# Patient Record
Sex: FEMALE | Race: WHITE | Employment: OTHER | URBAN - METROPOLITAN AREA
[De-identification: names, ages, dates, MRNs, and addresses within clinical notes are randomized per-mention and may not be internally consistent; named-entity substitution may affect disease eponyms.]

---

## 2020-08-24 ENCOUNTER — HOSPITAL ENCOUNTER (OUTPATIENT)
Dept: PHYSICAL THERAPY | Age: 75
Discharge: HOME | End: 2020-08-24
Payer: MEDICARE

## 2020-08-24 PROCEDURE — 97161 PT EVAL LOW COMPLEX 20 MIN: CPT

## 2020-08-24 PROCEDURE — 97110 THERAPEUTIC EXERCISES: CPT

## 2020-08-24 NOTE — THERAPY EVALUATION
Farzaneh Urbina  : 1945  [place]  Secondary:  [address]  at [facility] HENRY OVIEDO  [address] [facility], [address], [place] [address].  [phone]   [phone]       OUTPATIENT PHYSICAL THERAPY:Initial Assessment 2020     ICD-10: Treatment Diagnosis: Low back pain (M54.5)  Precautions/Allergies: Allergic to:  Latex, PCN, Benadryl, Betadine, Dilaudid  TREATMENT PLAN:  Effective Dates: 2020 TO 2020 (90 days). Frequency/Duration: 1-2 times a week for 90 Day(s) MEDICAL/REFERRING DIAGNOSIS:  Low back pain [M54.5]    DATE OF ONSET:   REFERRING PHYSICIAN: Lorene Avitia MD MD Orders: Lorrine Rom and treat  Return MD Appointment: 20 to Dr. Orlando Muñoz; to [facility] on 20     INITIAL ASSESSMENT:  Ms. Gonzalez Simmons is a 75year old female with complaints of back pain especially with standing or walking. She presents with decreased lumbar ROM, generalized weakness in LEs and abdomen, tight hamstrings, and radiographic findings of stenosis and facet arthropathy. She could benefit from PT to address deficits and work toward goals. She is also to follow up with back specialist at [facility] in mid September. PROBLEM LIST (Impacting functional limitations):  1. Decreased Strength  2. Decreased Ambulation Ability/Technique  3. Increased Pain  4. Decreased Flexibility/Joint Mobility INTERVENTIONS PLANNED: (Treatment may consist of any combination of the following)  1. Home Exercise Program (HEP)  2. Manual Therapy  3. Therapeutic Exercise/Strengthening  4. modals as needed   5. Aquatic PT as needed     GOALS: (Goals have been discussed and agreed upon with patient.)  Patient's stated goal is to be able to walk or stand longer before she has to sit down. Short-Term Functional Goals: Time Frame:6 weeks  1. Patient to be independent with HEP  2.  Patient to report she is able to walk or stand 50% longer without pain than she could prior to starting PT  Discharge Goals: Time Frame: 90 days  1. Patient to report no more than minimal back pain with all activity  2. Patient to improve Oswestry score to 10 (from 19) to demo improved functional mobility    OUTCOME MEASURE:   Tool Used: Modified Oswestry Low Back Pain Questionnaire  Score:  Initial: 19/50  Most Recent: X/50 (Date: -- )   Interpretation of Score: Each section is scored on a 0-5 scale, 5 representing the greatest disability. The scores of each section are added together for a total score of 50. MEDICAL NECESSITY:   · Patient demonstrates good rehab potential due to higher previous functional level. REASON FOR SERVICES/OTHER COMMENTS:  · Patient continues to require skilled intervention due to desire to return to higher level of function. Total Duration:  45 minutes evaluation, 10 minutes exercise  PT Patient Time In/Time Out  Time In: 1030  Time Out: 1125    Rehabilitation Potential For Stated Goals: Good  Regarding Lilly Galeas's therapy, I certify that the treatment plan above will be carried out by a therapist or under their direction. Thank you for this referral,    Cesilia Combs PT      Referring Physician Signature: Gabriel Taylor MD _______________________________ Date _____________       PAIN/SUBJECTIVE:   Initial: Pain Intensity 1: 0(None now, 5 or 6 at worst)   Post Session: \"No worse\"   HISTORY:   History of Injury/Illness (Reason for Referral):  Patient reports onset of symptoms in 2015 with gradual progression since then. Pain is worse with walking or standing and is relieved by sitting. She notes that she gets lymph edema massage regularly due to problems after her last round of chemo. Also sees a chiropractor regularly  Past Medical History/Comorbidities:  Breast cancer sugery in 9328,8599 and 2005; Peripheral neuropathy, high cholesterol, and L ankle fracture  Social History/Living Environment:     Lives with .  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 Ambulatory/Rehab Services H2 Model Falls Risk Assessment   Risk Factors:       (1)  Orthostatic drop in BP Ability to Rise from Chair:       (0)  Ability to rise in a single movement   Falls Prevention Plan:       No modifications necessary   Total: (5 or greater = High Risk): 1   ©2010 [facility] of [employer]. All Rights Reserved. [facility] [place] Patent #9,[id number]. Federal Law prohibits the replication, distribution or use without written permission from [facility] Virtual 3-D Display for Smartphones   Current Medications: Celebrex, Colcrys, Gabapentin, Pravastatin, Flonase, Asp[irin, Biotin, CoQ10, IPT, Omega 3, Probiotic, Zinc   Date Last Reviewed:  8/24/20   Number of Personal Factors/Comorbidities that affect the Plan of Care: 0: LOW COMPLEXITY   EXAMINATION:     Observation/Orthostatic Postural Assessment: patient is moderately overweight. Palpation: pelvic landmarks symmetrical  ROM: lumbar flex 100%, lumbar extn 25%, B side bend 50%, B rotation 75%  Strength: B LE's 4 to 4+/5. Weak in abdominals. Special Tests: none  Neurological Screen: light touch intact in LEs except on plantar surfaces of feet. Functional Mobility:  independent         Balance:  She has orthostatic hypotension so she has to wait before moving when she changes position. Body Structures Involved:  1. Joints  2. Muscles Body Functions Affected:  1. Neuromusculoskeletal  2. Movement Related Activities and Participation Affected:  1. General Tasks and Demands  2.  Community, Social and [place]   Number of elements (examined above) that affect the Plan of Care: 1-2: LOW COMPLEXITY   CLINICAL PRESENTATION:   Presentation: Stable and uncomplicated: LOW COMPLEXITY   CLINICAL DECISION MAKING:   Use of outcome tool(s) and clinical judgement create a POC that gives a: Questionable prediction of patient's progress: MODERATE COMPLEXITY

## 2020-08-24 NOTE — PROGRESS NOTES
Ben Carbajal  : 1945  [place] / Product Type: Medicare /  [address]  at [facility] HENRY OVIEDO  [address], [address], [place] [address].  [phone]   [phone]       OUTPATIENT PHYSICAL THERAPY: Daily Treatment Note 2020  Visit Count: 1  ICD-10: Treatment Diagnosis: Low back pain (M54.5)  Precautions/Allergies: Allergic to:  Latex, PCN, Benadryl, Betadine, Dilaudid  TREATMENT PLAN:  Effective Dates: 2020 TO 2020 (90 days). Frequency/Duration: 1-2 times a week for 90 Day(s) MEDICAL/REFERRING DIAGNOSIS:  Low back pain [M54.5]    DATE OF ONSET:   REFERRING PHYSICIAN: Caitie Ziegler MD MD Orders: Hernandez Bancroft and treat  Return MD Appointment: 20 to Dr. Deloris Sicard; to Beka Larsen on 20            Pre-treatment Symptoms/Complaints:  See evaluation  Pain: Initial: Pain Intensity 1: 0(None now, 5 or 6 at worst)   Post Session:  No increase   Medications Last Reviewed:  20    Updated Objective Findings:   See evaluation note from today     TREATMENT:     Therapeutic Exercise: (10 Minutes):  Exercises below to improve mobility and strength. Required moderate visual and verbal cues to instruct in exercises. Progressed complexity of movement as indicated. Instructed patient in seated hamstring stretch with 30 second hold, SKTC, DKTC, seated forward bend, trunk flex with ball and trunk diagonals with ball. HEP: as above. Written copy of HEP given to patient. [employer] Portal    Treatment/Session Summary:    · Response to Treatment:  No increase in pain. · Communication/Consultation:  Note to MD for signature  · Equipment provided today:  None today  · Recommendations/Intent for next treatment session: Next visit will focus on progression of exercises. She prefers to attend PT just once per week for now.      Total Treatment Billable Duration:  10 minutes exercise  PT Patient Time In/Time Out  Time In: 1030  Time Out: [facility]    Future Appointments   Date Time Provider Annabella Barrett   8/31/2020  8:30 AM Karri Chan, PT [facility]   9/10/2020  1:00 PM Karri Chan, ROBERTO CLANCY [facility]   9/16/2020  9:00 AM Prasanna Lang, PT [facility]   9/23/2020  9:00 AM Prasanna Lang, PT [facility]   9/28/2020  9:30 AM Karri Chan, ROBERTO [facility]

## 2020-08-31 ENCOUNTER — HOSPITAL ENCOUNTER (OUTPATIENT)
Dept: PHYSICAL THERAPY | Age: 75
Discharge: HOME | End: 2020-08-31
Payer: MEDICARE

## 2020-08-31 PROCEDURE — 97140 MANUAL THERAPY 1/> REGIONS: CPT

## 2020-08-31 PROCEDURE — 97110 THERAPEUTIC EXERCISES: CPT

## 2020-08-31 NOTE — PROGRESS NOTES
Jorge Dudley  : 1945  [place] / Product Type: Medicare /  [address] at [facility] HENRY OVIEDO  [address] [place] [address], [address], [place] [address].  [phone]   [phone]       OUTPATIENT PHYSICAL THERAPY: Daily Treatment Note 2020  Visit Count: 2  ICD-10: Treatment Diagnosis: Low back pain (M54.5)  Precautions/Allergies: Allergic to:  Latex, PCN, Benadryl, Betadine, Dilaudid  TREATMENT PLAN:  Effective Dates: 2020 TO 2020 (90 days). Frequency/Duration: 1-2 times a week for 90 Day(s) MEDICAL/REFERRING DIAGNOSIS:  Low back pain [M54.5]    DATE OF ONSET:   REFERRING PHYSICIAN: Caleb Keller MD MD Orders: [place]  and treat  Return MD Appointment: 20 to Dr. Anni Gonzalez; to [facility] on 20            Pre-treatment Symptoms/Complaints:  Merced Parkinson she went to her [employer] and used the NuStep for 20 minutes, but she was sore afterward so she will cut back on the time next time. Did her exercises at home on the bed. Pain: Initial: Pain Intensity 1: 0   Post Session:  No increase noted. Medications Last Reviewed:  20    Updated Objective Findings:   None Today     TREATMENT:     Therapeutic Exercise: (30 Minutes):  Exercises below to improve mobility and strength. Required moderate visual and verbal cues to instruct in exercises. Progressed complexity of movement as indicated. Date:  20 Date:   Date:     Activity/Exercise Parameters Parameters Parameters   Seated hamstring stretch B 1 x 30 sec     SKTC B x 10     DKTC 5 sec x 10     Trunk flex with ball 2x10     Trunk diagonals with ball 1x10     LTR 1x10     Seated forward bend 5 sec x 5     HEP:  continue current HEP    Manual Therapy ( 10 minutes) - for motion. Manual stretching of B hamstrings, followed by manual stretching into lumbar flexion on each side. Patient noted that there was no pain with manual therapy.    [employer] Portal    Treatment/Session Summary:    · Response to Treatment:  No increase in pain. · Communication/Consultation:  None today  · Equipment provided today:  None today  · Recommendations/Intent for next treatment session: Next visit will focus on progression of exercises. She plans to attend PT once per week and exercise on her own the other days.      Total Treatment Billable Duration:  40 minutes  PT Patient Time In/Time Out  Time In: 8664  Time Out: 0915    Héctor Levine, PT    Future Appointments   Date Time Provider Annabella Barrett   9/10/2020  1:00 PM Emma Chen, ROBERTO [facility]   9/16/2020  9:00 AM [facility] Quach, PT [facility]   9/23/2020  9:00 AM Joan Quach, PT [facility]   9/28/2020  9:30 AM Emma Chen, [facility]

## 2020-09-10 ENCOUNTER — HOSPITAL ENCOUNTER (OUTPATIENT)
Dept: PHYSICAL THERAPY | Age: 75
Discharge: HOME | End: 2020-09-10
Payer: MEDICARE

## 2020-09-10 PROCEDURE — 97140 MANUAL THERAPY 1/> REGIONS: CPT

## 2020-09-10 PROCEDURE — 97110 THERAPEUTIC EXERCISES: CPT

## 2020-09-10 NOTE — PROGRESS NOTES
Julieta Kirk  : 1945  [place] / Product Type: Medicare /  [address]  at [facility] HENRY OVIEDO  [address] [facility] [place] [address], [place] [address].  [phone]   [phone]       OUTPATIENT PHYSICAL THERAPY: Daily Treatment Note 9/10/2020  Visit Count: 3  ICD-10: Treatment Diagnosis: Low back pain (M54.5)  Precautions/Allergies: Allergic to:  Latex, PCN, Benadryl, Betadine, Dilaudid  TREATMENT PLAN:  Effective Dates: 2020 TO 2020 (90 days). Frequency/Duration: 1-2 times a week for 90 Day(s) MEDICAL/REFk pain [M54.5]    DATE OF ONSET:   REFEERRING DIAGNOSIS:  Low bacRRING PHYSICIAN: Suma Martinez MD MD Orders: Arromi Cortes and treat  Return MD Appointment: 20 to Dr. Denice Rand; to [facility] on 20            Pre-treatment Symptoms/Complaints:  Chanelle Calix she went to her [employer] and used the NuStep for about 15 minutes and felt okay with that. She goes to [facility] a week from tomorrow. Didn't notice much difference after last session. Not worse, but not better. Pain: Initial: Pain Intensity 1: 3   Post Session:  No increase noted by patient    Medications Last Reviewed:  9/10/20 Doxycycline added     Updated Objective Findings:   None Today     TREATMENT:     Therapeutic Exercise: (25 Minutes):  Exercises below to improve mobility and strength. Required moderate visual and verbal cues to instruct in exercises. Progressed complexity of movement as indicated. Date:  20 Date:  9/10/20 Date:     Activity/Exercise Parameters Parameters Parameters   Seated hamstring stretch B 1 x 30 sec -    SKTC B x 10 B 1x10    DKTC 5 sec x 10 5 sec x10    Trunk flex with ball 2x10 2x10    Trunk diagonals with ball 1x10 1x10    LTR 1x10 1x10    Seated forward bend 5 sec x 5 -    Rock back on all 4's  1x10    HEP:  continue current HEP    Manual Therapy ( 15 minutes) - for motion.   Manual stretching of B hamstrings, followed by manual stretching into lumbar flexion on each side and then B lumbar flexion. [employer] Portal    Treatment/Session Summary:    · Response to Treatment:  No overall change yet. Patient is scheduled to see POA on Monday about possible back injections. · Communication/Consultation:  None today  · Equipment provided today:  None today  · Recommendations/Intent for next treatment session: Next visit will focus on progression of exercises.        Total Treatment Billable Duration:  40 minutes  PT Patient Time In/Time Out  Time In: 1302  Time Out: Stephanie Cervantes, PT    Future Appointments   Date Time Provider Annabella Barrett   9/16/2020  9:00 AM Jim Lopez, PT [facility]   9/23/2020  9:00 AM [facility], [facility]   9/28/2020  9:30 AM Lauri Colby, PT [facility]

## 2020-09-16 ENCOUNTER — HOSPITAL ENCOUNTER (OUTPATIENT)
Dept: PHYSICAL THERAPY | Age: 75
Discharge: HOME | End: 2020-09-16
Payer: MEDICARE

## 2020-09-16 PROCEDURE — 97110 THERAPEUTIC EXERCISES: CPT

## 2020-09-16 PROCEDURE — 97140 MANUAL THERAPY 1/> REGIONS: CPT

## 2020-09-16 NOTE — PROGRESS NOTES
Les Curtis  : 1945  [place] / Product Type: Medicare /  [address]  at [facility] HENRY OVIEDO  [address] [facility] [place] [facility], [address], [address] [place] [address]  [phone]   [phone]       OUTPATIENT PHYSICAL THERAPY: Daily Treatment Note 2020  Visit Count: 4  ICD-10: Treatment Diagnosis: Low back pain (M54.5)  Precautions/Allergies: Allergic to:  Latex, PCN, Benadryl, Betadine, Dilaudid  TREATMENT PLAN:  Effective Dates: 2020 TO 2020 (90 days). Frequency/Duration: 1-2 times a week for 90 Day(s) MEDICAL/REFk pain [M54.5]    DATE OF ONSET:   REFEERRING DIAGNOSIS:  Low bacRRING PHYSICIAN: Humaira Mills MD MD Orders: [address] and treat  Return MD Appointment: 20 to Dr. Bonny Lopez; to [facility] on 20            Pre-treatment Symptoms/Complaints:  she is going to have an injection. She got approval for a spinal injection. Dr. Omi Nguyen is performing injection  Pain: Initial:    achy down both legs  Post Session:  No increase noted by patient    Medications Last Reviewed:  9/10/20 Doxycycline added     Updated Objective Findings:   None Today     TREATMENT:     Therapeutic Exercise: (25 Minutes):  Exercises below to improve mobility and strength. Required moderate visual and verbal cues to instruct in exercises. Progressed complexity of movement as indicated.  Kinesio tape applied to lumbar spine for facilitation of paraspinals      Date:  20 Date:  9/10/20 Date:  20   Activity/Exercise Parameters Parameters Parameters   Seated hamstring stretch B 1 x 30 sec - -   SKTC B x 10 B 1x10 10\" x5    DKTC 5 sec x 10 5 sec x10 10\"x5    Trunk flex with ball 2x10 2x10 2x10   Trunk diagonals with ball 1x10 1x10 1x10   LTR 1x10 1x10 10x   Seated forward bend 5 sec x 5 - -   Rock back on all 4's  1x10 -   Pelvic tilts - - 2x10   Pelvic tilt then bridge - - 10x   Clam shell - - 10xB   HEP:  continue current HEP    Manual Therapy ( 15 minutes) - for motion and decreased pain. Pt in side lying and PVIM to the lumbar spine for lumbar flexion ROM. Pt prone and central PAs to lumbar spine and sacrum. [employer] Portal    Treatment/Session Summary:    · Response to Treatment: Progressed strengthening exercises and she fatigued with side lying clam shell and bridges. · Communication/Consultation:  None today  · Equipment provided today:  None today  · Recommendations/Intent for next treatment session: Next visit will focus on progression of exercises.        Total Treatment Billable Duration:  40 minutes  PT Patient Time In/Time Out  Time In: 1238  Time Out: [address], PT    Future Appointments   Date Time Provider Annabella Barrett   9/23/2020  9:00 AM Sam Quach, PT [facility]   9/28/2020  9:30 AM Emma Chen, ROBERTO CLANCY [facility]

## 2020-09-23 ENCOUNTER — HOSPITAL ENCOUNTER (OUTPATIENT)
Dept: PHYSICAL THERAPY | Age: 75
Discharge: HOME | End: 2020-09-23
Payer: MEDICARE

## 2020-09-23 PROCEDURE — 97110 THERAPEUTIC EXERCISES: CPT

## 2020-09-23 PROCEDURE — 97140 MANUAL THERAPY 1/> REGIONS: CPT

## 2020-09-23 NOTE — PROGRESS NOTES
Gary Tate  : 1945  [place] / Product Type: Medicare /  [address]  at [facility] HENRY OVIEDO  [address] [place] [address], [address], [place] [address].  [phone]   [phone]       OUTPATIENT PHYSICAL THERAPY: Daily Treatment Note 2020  Visit Count: 5  ICD-10: Treatment Diagnosis: Low back pain (M54.5)  Precautions/Allergies: Allergic to:  Latex, PCN, Benadryl, Betadine, Dilaudid  TREATMENT PLAN:  Effective Dates: 2020 TO 2020 (90 days). Frequency/Duration: 1-2 times a week for 90 Day(s) MEDICAL/REFk pain [M54.5]    DATE OF ONSET:   REFEERRING DIAGNOSIS:  Low bacRRING PHYSICIAN: Giuliano Ryan MD MD Orders[place] and treat  Return MD Appointment: 20 to Dr. Alba Petersen; to [facility] on 20            Pre-treatment Symptoms/Complaints: She went to [employer] over the weekend for a wedding and rode in the wheelchair around the airport. She is receiving her spinal injection tomorrow afternoon. Pain: Initial:    achy down both legs  Post Session:  No increase noted by patient    Medications Last Reviewed:  9/10/20 Doxycycline added     Updated Objective Findings:   None Today     TREATMENT:     Therapeutic Exercise: (30 Minutes):  Exercises below to improve mobility and strength. Required moderate visual and verbal cues to instruct in exercises. Progressed complexity of movement as indicated.  Kinesio tape applied to lumbar spine for facilitation of paraspinals      Date:  20 Date:  9/10/20 Date:  20 Date  20   Activity/Exercise Parameters Parameters Parameters parameters   Seated hamstring stretch B 1 x 30 sec - - Supine 20\"x2 B   SKTC B x 10 B 1x10 10\" x5  10\"x5   DKTC 5 sec x 10 5 sec x10 10\"x5  10\"x5   Trunk flex with ball 2x10 2x10 2x10 2x10   Trunk diagonals with ball 1x10 1x10 1x10 2x10   LTR 1x10 1x10 10x 10x   Seated forward bend 5 sec x 5 - -    Rock back on all 4's  1x10 -    Pelvic tilts - - 2x10 2x10   Pelvic tilt then bridge - - 10x 2x10   Clam shell - - 10xB 2x10 B   Standing leaning over hip extension - - - 2x10 B   HEP:  continue current HEP    Manual Therapy ( 10 minutes) - for motion and decreased pain. Pt in side lying and PVIM to the lumbar spine for lumbar flexion ROM. Pt in hook lying and manual lumbar traction through legs 30\"x5. [employer] Portal    Treatment/Session Summary:    · Response to Treatment: No reports of pain with strengthening exercises but she did have muscle fatigue with clam shells and standing on R LE with L hip extension. · Communication/Consultation:  None today  · Equipment provided today:  None today  · Recommendations/Intent for next treatment session: Next visit will focus on progression of exercises. Core and LE strengthening exercises.      Total Treatment Billable Duration:  40 minutes  PT Patient Time In/Time Out  Time In: 0902  Time Out: [address] SINGER PT    Future Appointments   Date Time Provider Annabella Barrett   9/28/2020  9:30 AM Tommy Bran, PT [facility]

## 2020-09-28 ENCOUNTER — HOSPITAL ENCOUNTER (OUTPATIENT)
Dept: PHYSICAL THERAPY | Age: 75
Discharge: HOME | End: 2020-09-28
Payer: MEDICARE

## 2020-09-28 PROCEDURE — 97110 THERAPEUTIC EXERCISES: CPT

## 2020-09-28 NOTE — PROGRESS NOTES
Alla Patient  : 1945  [place] / Product Type: Medicare /  [address]  at [facility] HENRY OVIEDO  [address] [place] [address], [address], [place] [address].  [phone]   [phone]       OUTPATIENT PHYSICAL THERAPY: Daily Treatment Note 2020  Visit Count: 6  ICD-10: Treatment Diagnosis: Low back pain (M54.5)  Precautions/Allergies: Allergic to:  Latex, PCN, Benadryl, Betadine, Dilaudid  TREATMENT PLAN:  Effective Dates: 2020 TO 2020 (90 days). Frequency/Duration: 1-2 times a week for 90 Day(s) MEDICAL/REFk pain [M54.5]    DATE OF ONSET:   REFEERRING DIAGNOSIS:  Low bacRRING PHYSICIAN: Yas Herndon MD MD Orders: Butch Velez and treat  Return MD Appointment: PRN            Pre-treatment Symptoms/Complaints: She got injection in her back last week and it has helped. She saw Dr. Marisela Mosqueda last week and he said she needed to continue working on her core. She plans to come to PT once per week in October, and then hopefully be done. Pain: Initial: Pain Intensity 1: 0    Post Session:  No increase noted by patient    Medications Last Reviewed:  20 Doxycycline continued     Updated Objective Findings:   None Today     TREATMENT:     Therapeutic Exercise: (40 Minutes):  Exercises below to improve mobility and strength. Required moderate visual and verbal cues to ensure correct performance. Progressed complexity of movement as indicated.         Date:  20 Date:  9/10/20 Date:  20 Date  20 Date  20   Activity/Exercise Parameters Parameters Parameters parameters    Seated hamstring stretch B 1 x 30 sec - - Supine 20\"x2 B -   SKTC B x 10 B 1x10 10\" x5  10\"x5 B 1x10   DKTC 5 sec x 10 5 sec x10 10\"x5  10\"x5 1x10   Trunk flex with ball 2x10 2x10 2x10 2x10 2x10   Trunk diagonals with ball 1x10 1x10 1x10 2x10 1x10   LTR 1x10 1x10 10x 10x 1x10   Seated forward bend 5 sec x 5 - -  5 sec x 10   Rock back on all 4's  1x10 -  5 sec x 10   Pelvic tilts - - 2x10 2x10 2x10   Pelvic tilt then bridge - - 10x 2x10 -   Bridge on ball     2x10   Clam shell - - 10xB 2x10 B L 2x15  R 1 x 8, 1x7   Standing leaning over hip extension - - - 2x10 B -   Side lying hip abduction     B 1x10   LAQs     1.5# B 2x15   HEP:  continue current HEP     Manual Therapy ( 0 minutes) - none today  [employer] Portal    Treatment/Session Summary:    · Response to Treatment: No reports of pain with strengthening exercises. She is very weak in hips, especially R hip and couldn't finish clam shells. · Communication/Consultation:  None today  · Equipment provided today:  None today  · Recommendations/Intent for next treatment session: Next visit will focus on progression of exercises. Core and LE strengthening exercises.      Total Treatment Billable Duration:  40 minutes  PT Patient Time In/Time Out  Time In: 0930  Time Out: Yinka Burks, ROBERTO    Future Appointments   Date Time Provider Annabella Barrett   10/5/2020  3:45 PM Alejandro Bajwa PT [facility]   10/12/2020 10:15 AM ROBERTO [facility]   10/19/2020  9:00 AM ROBERTO [facility]idal[facility]   10/26/2020  1:00 PM ROBERTO Hidalgo [facility]

## 2020-10-05 ENCOUNTER — HOSPITAL ENCOUNTER (OUTPATIENT)
Dept: PHYSICAL THERAPY | Age: 75
Discharge: HOME | End: 2020-10-05
Payer: MEDICARE

## 2020-10-05 PROCEDURE — 97110 THERAPEUTIC EXERCISES: CPT

## 2020-10-05 NOTE — PROGRESS NOTES
Valeria Mills  : 1945  [place] / Product Type: Medicare /  [address]  at [facility] HENRY OVIEDO  [address] [place] [address], [address], [address] [place] [address]  [phone]   [phone]       OUTPATIENT PHYSICAL THERAPY: Daily Treatment Note 10/5/2020  Visit Count: 7  ICD-10: Treatment Diagnosis: Low back pain (M54.5)  Precautions/Allergies: Allergic to:  Latex, PCN, Benadryl, Betadine, Dilaudid  TREATMENT PLAN:  Effective Dates: 2020 TO 2020 (90 days). Frequency/Duration: 1-2 times a week for 90 Day(s) MEDICAL/REFk pain [M54.5]    DATE OF ONSET:   REFEERRING DIAGNOSIS:  Low bacRRING PHYSICIAN: Coty Garcia MD MD Orders: Thelma Laser and treat  Return MD Appointment: PRN            Pre-treatment Symptoms/Complaints: She voted earlier today and had to wait in line for a while. Saw her chiropractor this morning. .   Pain: Initial: Pain Intensity 1: 0(No pain, but \"discomfort is a 4 or 5\")    Post Session:  No increase noted by patient    Medications Last Reviewed:  10/5/20 Doxycycline continued, but down to 1x/day instead of twice. Updated Objective Findings:   None Today     TREATMENT:     Therapeutic Exercise: (40 Minutes):  Exercises below to improve mobility and strength. Required moderate visual and verbal cues to ensure correct performance. Progressed complexity of movement as indicated.         Date:  20 Date:  9/10/20 Date:  20 Date  20 Date  20 Date  10/5/20   Activity/Exercise Parameters Parameters Parameters parameters     Seated hamstring stretch B 1 x 30 sec - - Supine 20\"x2 B - -   SKTC B x 10 B 1x10 10\" x5  10\"x5 B 1x10 B 1x15   DKTC 5 sec x 10 5 sec x10 10\"x5  10\"x5 1x10 1x10   Trunk flex with ball 2x10 2x10 2x10 2x10 2x10 2x10   Trunk diagonals with ball 1x10 1x10 1x10 2x10 1x10 1x10   LTR 1x10 1x10 10x 10x 1x10 -   Seated forward bend 5 sec x 5 - -  5 sec x 10 5 sec x 10   Rock back on all 4's  1x10 - 5 sec x 10 -   Pelvic tilts - - 2x10 2x10 2x10 2x10   Pelvic tilt then bridge - - 10x 2x10 - -   Bridge on ball     2x10 2x10   SLR      B 2x10   Clam shell - - 10xB 2x10 B L 2x15  R 1 x 8, 1x7 B 2x10   Standing leaning over hip extension - - - 2x10 B - -   Side lying hip abduction     B 1x10 B 2x10   LAQs     1.5# B 2x15 2# B 2x15   Standing hip abduction      B 2x10   HEP:  continue current HEP     Manual Therapy ( 0 minutes) - none today  [employer] Portal    Treatment/Session Summary:    · Response to Treatment: No reports of pain with strengthening exercises. She is still somewhat weak in hips, but gradually improving. · Communication/Consultation:  None today  · Equipment provided today:  None today  · Recommendations/Intent for next treatment session: Next visit will focus on progression of exercises. Core and LE strengthening exercises.      Total Treatment Billable Duration:  40 minutes  PT Patient Time In/Time Out  Time In: 3686  Time Out: 0666    Tali Brothers PT    Future Appointments   Date Time Provider Annabella Barrett   10/12/2020 10:15 AM ROBERTO Hidalgo [facility]   10/19/2020  9:00 AM ROBERTO Hidalgo [facility]   10/26/2020  1:00 PM ROBERTO Torres [facility]

## 2020-10-12 ENCOUNTER — HOSPITAL ENCOUNTER (OUTPATIENT)
Dept: PHYSICAL THERAPY | Age: 75
Discharge: HOME | End: 2020-10-12
Payer: MEDICARE

## 2020-10-12 PROCEDURE — 97110 THERAPEUTIC EXERCISES: CPT

## 2020-10-12 NOTE — PROGRESS NOTES
Meek Job  : 1945  [place] / Product Type: Medicare /  [address]  at [facility] HENRY OVIEDO  [address] [place] [address], [address], [address] [place] [address]  [phone]   [phone]       OUTPATIENT PHYSICAL THERAPY: Daily Treatment Note 10/12/2020  Visit Count: 8  ICD-10: Treatment Diagnosis: Low back pain (M54.5)  Precautions/Allergies: Allergic to:  Latex, PCN, Benadryl, Betadine, Dilaudid  TREATMENT PLAN:  Effective Dates: 2020 TO 2020 (90 days). Frequency/Duration: 1-2 times a week for 90 Day(s) MEDICAL/REFk pain [M54.5]    DATE OF ONSET:   REFEERRING DIAGNOSIS:  Low bacRRING PHYSICIAN: Arsenio Stratton MD MD Orders: Anabell Iniguez and treat  Return MD Appointment: PRN            Pre-treatment Symptoms/Complaints: She doesn't have pain with sitting, but still gets pain in R leg with walking. She has tried icing the R hip as suggested by PT. Sees [facility] this afternoon for a follow up after the shot. Pain: Initial: Pain Intensity 1: 0(none at rest, 5 with walking)    Post Session:  No increase noted by patient    Medications Last Reviewed:  10/12/20 Doxycycline continued 1x/day. Updated Objective Findings:   None Today     TREATMENT:     Therapeutic Exercise: (40 Minutes):  Exercises below to improve mobility and strength. Required moderate visual and verbal cues to ensure correct performance. Progressed complexity of movement as indicated.         Date:  20 Date:  9/10/20 Date:  20 Date  20 Date  20 Date  10/5/20 Date  10/12/20   Activity/Exercise Parameters Parameters Parameters parameters      Seated hamstring stretch B 1 x 30 sec - - Supine 20\"x2 B - - -   SKTC B x 10 B 1x10 10\" x5  10\"x5 B 1x10 B 1x15 B 1x10   DKTC 5 sec x 10 5 sec x10 10\"x5  10\"x5 1x10 1x10 1x10   Trunk flex with ball 2x10 2x10 2x10 2x10 2x10 2x10 1x10   Trunk diagonals with ball 1x10 1x10 1x10 2x10 1x10 1x10 1x10   LTR 1x10 1x10 10x 10x 1x10 - 1x10   Seated forward bend 5 sec x 5 - -  5 sec x 10 5 sec x 10 -   Rock back on all 4's  1x10 -  5 sec x 10 - -   Pelvic tilts - - 2x10 2x10 2x10 2x10 2x10   Pelvic tilt then bridge - - 10x 2x10 - - -   Bridge on ball     2x10 2x10 2x10   SLR      B 2x10 B 2x10   Clam shell - - 10xB 2x10 B L 2x15  R 1 x 8, 1x7 B 2x10 1# B 2x10   Standing leaning over hip extension - - - 2x10 B - - -   Side lying hip abduction     B 1x10 B 2x10 L 2x10  R 2x15   LAQs     1.5# B 2x15 2# B 2x15 2# B 2x15   Standing hip abduction      B 2x10 -   Sit to stand      Ground                            foam       1x10  2x10   HEP:  continue current HEP     Manual Therapy ( 0 minutes) - none today  [facility] Portal    Treatment/Session Summary:    · Response to Treatment: No reports of pain with strengthening exercises, but she is still weak in R hip. May ask POA to xray it when she sees them today for follow up on her back. · Communication/Consultation:  None today  · Equipment provided today:  None today  · Recommendations/Intent for next treatment session: Next visit will focus on core and LE strengthening exercises.      Total Treatment Billable Duration:  40 minutes  PT Patient Time In/Time Out  Time In: 1020  Time Out: 1100    Irais Davis PT    Future Appointments   Date Time Provider Annabella Barrett   10/19/2020  9:00 AM Trinity Franco, PT [facility]   10/26/2020  1:00 PM Dane Hui, PT [facility]

## 2020-10-19 ENCOUNTER — HOSPITAL ENCOUNTER (OUTPATIENT)
Dept: PHYSICAL THERAPY | Age: 75
Discharge: HOME | End: 2020-10-19
Payer: MEDICARE

## 2020-10-19 PROCEDURE — 97110 THERAPEUTIC EXERCISES: CPT

## 2020-10-19 NOTE — PROGRESS NOTES
Kai Fail  : 1945  [place] / Product Type: Medicare /  [address]  at [facility] HENRY OVIEDO  [address] [place] [address], [address], [place] [address].  [phone]   [phone]       OUTPATIENT PHYSICAL THERAPY: Daily Treatment Note 10/19/2020  Visit Count: 9  ICD-10: Treatment Diagnosis: Low back pain (M54.5)  Precautions/Allergies: Allergic to:  Latex, PCN, Benadryl, Betadine, Dilaudid  TREATMENT PLAN:  Effective Dates: 2020 TO 2020 (90 days). Frequency/Duration: 1-2 times a week for 90 Day(s) MEDICAL/REFk pain [M54.5]    DATE OF ONSET:   REFEERRING DIAGNOSIS:  Low bacRRING PHYSICIAN: Jeannie Hall MD MD Orders: Mary Burks and treat  Return MD Appointment: PRN            Pre-treatment Symptoms/Complaints: She returned to MD last week and is now going to get another shot tomorrow. They will try to do the shot more on the right side since the last time they did it more on the left. Was in pain for a while after last visit - thinks it may have been from using weights on the right leg for some of the exercises. Next week will be her last PT appointment and she will plan to continue on her own after that. Pain: Initial: Pain Intensity 1: 7(\"6 or 7\")    Post Session:  Less, at least for now. Medications Last Reviewed:  10/19/20 Doxycycline continued 1x/day. Updated Objective Findings:   None Today     TREATMENT:     Therapeutic Exercise: (40 Minutes):  Exercises below to improve mobility and strength. Required moderate visual and verbal cues to ensure correct performance. Progressed complexity of movement as indicated.         Date:  20 Date  20 Date  20 Date  10/5/20 Date  10/12/20 Date  10/19/20   Activity/Exercise Parameters parameters       Seated hamstring stretch - Supine 20\"x2 B - - - -   SKTC 10\" x5  10\"x5 B 1x10 B 1x15 B 1x10 B 1x10   DKTC 10\"x5  10\"x5 1x10 1x10 1x10 1x10   Trunk flex with ball 2x10 Visit Information Date & Time Provider Department Dept. Phone Encounter #  
 4/18/2017  2:15 PM Evelio Youssef MD Internist of [facility] [id number] Your Appointments 5/2/2017  9:00 AM  
Office Visit with Swetha Santana MD  
[facility] Appt Note: ov  
 [facility] Dorna Hence [address] [facility]  
[phone] [facility] Upcoming Health Maintenance Date Due Hepatitis C Screening 1961 DTaP/Tdap/Td series (1 - Tdap) 3/8/1981 PAP AKA CERVICAL CYTOLOGY 3/8/1981 BREAST CANCER SCRN MAMMOGRAM 3/8/2010 FOBT Q 1 YEAR AGE 50-75 3/8/2010 INFLUENZA AGE 9 TO ADULT 8/1/2016 Allergies as of 4/18/2017  Review Complete On: 3/16/2017 By: Evelio Youssef MD  
 No Known Allergies Current Immunizations  Reviewed on 7/12/2013 No immunizations on file. Not reviewed this visit Vitals BP Pulse Temp Height(growth percentile) Weight(growth percentile) SpO2  
 110/66 68 98.3 °F (36.8 °C) (Oral) 5' 3\" (1.6 m) 217 lb (98.4 kg) 99% BMI OB Status Smoking Status 38.44 kg/m2 Hysterectomy Never Smoker Vitals History BMI and BSA Data Body Mass Index Body Surface Area  
 38.44 kg/m 2 2.09 m 2 Preferred Pharmacy Pharmacy Name Phone [phone] [address] [phone] Your Updated Medication List  
  
   
This list is accurate as of: 4/18/17  2:58 PM.  Always use your most recent med list. amLODIPine 10 mg tablet Commonly known as:  NORVASC  
take 1 tablet by mouth once daily COREG CR 40 mg CR capsule Generic drug:  carvedilol TAKE 1 CAPSULES DAILY WITH BREAKFAST  
  
 lisinopril-hydroCHLOROthiazide 20-12.5 mg per tablet Commonly known as:  PRINZIDE, ZESTORETIC  
take 1 tablet by mouth every morning for blood pressure 2x10 2x10 2x10 1x10 2x10   Trunk diagonals with ball 1x10 2x10 1x10 1x10 1x10 B 1x10   LTR 10x 10x 1x10 - 1x10 1x10   Seated forward bend -  5 sec x 10 5 sec x 10 - -   Rock back on all 4's -  5 sec x 10 - - -   Pelvic tilts 2x10 2x10 2x10 2x10 2x10 2x10   Pelvic tilt then bridge 10x 2x10 - - - -   Bridge on ball   2x10 2x10 2x10 2x10   SLR    B 2x10 B 2x10 B 1x10   Clam shell 10xB 2x10 B L 2x15  R 1 x 8, 1x7 B 2x10 1# B 2x10 0# B 2x10   Standing leaning over hip extension - 2x10 B - - - -   Side lying hip abduction   B 1x10 B 2x10 L 2x10  R 2x15 L 2x10  R 1x10, 1x5   LAQs   1.5# B 2x15 2# B 2x15 2# B 2x15 1# B 2x10   Standing hip abduction    B 2x10 - -   Sit to stand      Ground                            foam     1x10  2x10 -  2x10   Scap retract with band      Red* 2x10            * = latex free band  HEP:  continue current HEP      Manual Therapy ( 0 minutes) - none today  [facility] Portal    Treatment/Session Summary:    · Response to Treatment: No increased pain with exercises. She plans to have another injection tomorrow. Next week will likely be last PT session. · Communication/Consultation:  None today  · Equipment provided today:  Latex free red band issued for HEP. · Recommendations/Intent for next treatment session: Next visit will focus on core and LE strengthening exercises.      Total Treatment Billable Duration:  40 minutes  PT Patient Time In/Time Out  Time In: 1148  Time Out: 0940    Tamela Rosales, PT    Future Appointments   Date Time Provider Annabella Barrett   10/26/2020  1:00 PM Markel Colin, PT [facility] PREMARIN 0.625 mg tablet Generic drug:  conjugated estrogens  
take 1 tablet by mouth once daily  
  
 promethazine-codeine 6.25-10 mg/5 mL syrup Commonly known as:  PHENERGAN with CODEINE  
TAKE 5 MLS BY MOUTH FOUR TIMES A DAY  
  
 sertraline 100 mg tablet Commonly known as:  ZOLOFT  
take 1 tablet by mouth daily VITAMIN D 5,000 unit Tab tablet Generic drug:  cholecalciferol (VITAMIN D3) Take  by mouth. Introducing [facility] & HEALTH SERVICES! Dear Agueda Franco: Thank you for requesting a [employer] account. Our records indicate that you already have an active [employer] account. You can access your account anytime at [email or web address] Did you know that you can access your hospital and ER discharge instructions at any time in [employer]? You can also review all of your test results from your hospital stay or ER visit. Additional Information If you have questions, please visit the Frequently Asked Questions section of the [employer] website at [email or web address] Remember, [employer] is NOT to be used for urgent needs. For medical emergencies, dial 911. Now available from your iPhone and Android! Please provide this summary of care documentation to your next provider. Your primary care clinician is listed as Guadalupe Miller. If you have any questions after today's visit, please call [phone].

## 2020-10-26 ENCOUNTER — HOSPITAL ENCOUNTER (OUTPATIENT)
Dept: PHYSICAL THERAPY | Age: 75
Discharge: HOME | End: 2020-10-26
Payer: MEDICARE

## 2020-10-26 PROCEDURE — 97110 THERAPEUTIC EXERCISES: CPT

## 2020-10-26 NOTE — PROGRESS NOTES
Abhilash Partida  : 1945  [place] / Product Type: Medicare /  [address]  at [facility] HENRY OVIEDO  [address] [place] [address], [address], [place] [address].  [phone]   [phone]       OUTPATIENT PHYSICAL THERAPY: Daily Treatment Note 10/26/2020  Visit Count: 10  ICD-10: Treatment Diagnosis: Low back pain (M54.5)  Precautions/Allergies: Allergic to:  Latex, PCN, Benadryl, Betadine, Dilaudid  TREATMENT PLAN:  Effective Dates: 2020 TO 2020 (90 days). Frequency/Duration: 1-2 times a week for 90 Day(s) MEDICAL/REFk pain [M54.5]    DATE OF ONSET:   REFEERRING DIAGNOSIS:  Low bacRRING PHYSICIAN: Symone Rodriguez MD MD Orders: Dionteromeo Castillo and treat  Return MD Appointment: PRN            Pre-treatment Symptoms/Complaints: She had eyes dilated for an appointment this morning. Today will be her last visit. Still gets pain with walking. She had her second spinal injection last week but it didn't seem to make much difference. Pain: Initial: Pain Intensity 1: 0(none at rest)    Post Session:  Not rated   Medications Last Reviewed:  10/26/20 Doxycycline continued 1x/day. Updated Objective Findings:   See discharge summary     TREATMENT:     Therapeutic Exercise: (40 Minutes):  Exercises below to improve mobility and strength. Required minimal visual and verbal cues to ensure correct performance.           Date  10/5/20 Date  10/12/20 Date  10/19/20 Date  10/26/20   Activity/Exercise       SKTC B 1x15 B 1x10 B 1x10 B 1x10   DKTC 1x10 1x10 1x10 1x10   Trunk flex with ball 2x10 1x10 2x10 2x10   Trunk diagonals with ball 1x10 1x10 B 1x10 B 1x10   LTR - 1x10 1x10 1x10   Seated forward bend 5 sec x 10 - - -   Pelvic tilts 2x10 2x10 2x10 2x10   Bridge on ball 2x10 2x10 2x10 2x10   SLR B 2x10 B 2x10 B 1x10 B 2x10   Clam shell B 2x10 1# B 2x10 0# B 2x10 1# B 2x10   Side lying hip abduction B 2x10 L 2x10  R 2x15 L 2x10  R 1x10, 1x5 B 2x10   LAQs 2# B 2x15 2# B 2x15 1# B 2x10 1# B 2x15   Standing hip abduction B 2x10 - - -   Sit to stand      Ground                            foam  1x10  2x10 -  2x10 -  2x10   Scap retract with band   Red* 2x10 -   * = latex free band  HEP:  continue current HEP      Manual Therapy ( 0 minutes) - none today  [employer] Portal    Treatment/Session Summary:    · Response to Treatment: No increased pain with exercises. She feels ready to continue on her own. See discharge summary. · Communication/Consultation:  None today  · Equipment provided today:  Latex free red band issued for HEP. · Recommendations/Intent for next treatment session: None    Total Treatment Billable Duration:  40 minutes  PT Patient Time In/Time Out  Time In: 1300  Time Out: 1343    Vanessa Clink, PT    No future appointments.

## 2020-10-26 NOTE — THERAPY DISCHARGE
Analy Barrera  : 1945  [place]  Secondary:  [address]  at [facility]EN[facility]VIEDO  [address] [facility] [place], [address] [place] [address],[address], [place] [address].  [phone]   [phone]       OUTPATIENT PHYSICAL THERAPY:Discharge Summary 10/26/2020     ICD-10: Treatment Diagnosis: Low back pain (M54.5)  Precautions/Allergies: Allergic to:  Latex, PCN, Benadryl, Betadine, Dilaudid  TREATMENT PLAN: Discharge PT  Patient attended 10 PT sessions from 20 to 10/26/20 with no missed sessions. MEDICAL/REFERRING DIAGNOSIS:  Low back pain [M54.5]    DATE OF ONSET:   REFERRING PHYSICIAN: Luciano Haile MD MD Orders: Mario Villarreal and treat  Return MD Appointment: PRN     ASSESSMENT:  Ms. Vicki Matias is a 75year old female with complaints of back pain especially with standing or walking. She presented with decreased lumbar ROM, generalized weakness in LEs and abdomen, tight hamstrings, and radiographic findings of stenosis and facet arthropathy. She attended PT regularly and now feels she can continue on her own with HEP. She will be discharged from PT.      GOALS: (Goals have been discussed and agreed upon with patient.)  Patient's stated goal is to be able to walk or stand longer before she has to sit down. Short-Term Functional Goals: Time Frame:6 weeks  1. Patient to be independent with HEP - MET  2. Patient to report she is able to walk or stand 50% longer without pain than she could prior to starting PT - not MET  Discharge Goals: Time Frame: 90 days  1. Patient to report no more than minimal back pain with all activity - not MET  2.  Patient to improve Oswestry score to 10 (from ) to demo improved functional mobility - partially MET (12 on 10/26/20)    OUTCOME MEASURE:   Tool Used: Modified Oswestry Low Back Pain Questionnaire  Score:  Initial:   Most Recent:  (Date: 10/26/20 )   Interpretation of Score: Each section is scored on a 0-5 scale, 5 representing the greatest disability. The scores of each section are added together for a total score of 50. Data from initial evaluation unless otherwise indicated. PAIN/SUBJECTIVE:   Initial: Pain Intensity 1: 0(none at rest)   Post Session:  Not rated   HISTORY:   History of Injury/Illness (Reason for Referral):  Patient reports onset of symptoms in 2015 with gradual progression since then. Pain is worse with walking or standing and is relieved by sitting. She notes that she gets lymph edema massage regularly due to problems after her last round of chemo. Also sees a chiropractor regularly  Past Medical History/Comorbidities:  Breast cancer sugery in 1444,3161 and 2005; Peripheral neuropathy, high cholesterol, and L ankle fracture  Social History/Living Environment:     Lives with . 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 Current Medications: Celebrex, Colcrys, Gabapentin, Pravastatin, Flonase, Asp[irin, Biotin, CoQ10, IPT, Omega 3, Probiotic, Zinc   Date Last Reviewed: 10/26/2020   EXAMINATION:     Observation/Orthostatic Postural Assessment: patient is moderately overweight. Palpation: pelvic landmarks symmetrical  ROM: lumbar flex 100%, lumbar extn 25%, B side bend 50%, B rotation 75%  Strength: B LE's 4 to 4+/5. Weak in abdominals. Special Tests: none  Neurological Screen: light touch intact in LEs except on plantar surfaces of feet. Functional Mobility:  independent         Balance:  She has orthostatic hypotension so she has to wait before moving when she changes position.

## 2021-06-04 RX ORDER — CEFAZOLIN SODIUM/WATER 2 G/20 ML
2 SYRINGE (ML) INTRAVENOUS ONCE
Status: CANCELLED | OUTPATIENT
Start: 2021-06-04 | End: 2021-06-04

## 2021-06-16 ENCOUNTER — HOSPITAL ENCOUNTER (OUTPATIENT)
Dept: SURGERY | Age: 76
Discharge: HOME | End: 2021-06-16
Payer: MEDICARE

## 2021-06-16 VITALS
HEART RATE: 67 BPM | TEMPERATURE: 97.7 F | BODY MASS INDEX: 34.68 KG/M2 | OXYGEN SATURATION: 96 % | RESPIRATION RATE: 20 BRPM | WEIGHT: 183.7 LBS | HEIGHT: 61 IN | DIASTOLIC BLOOD PRESSURE: 84 MMHG | SYSTOLIC BLOOD PRESSURE: 185 MMHG

## 2021-06-16 LAB
ANION GAP SERPL CALC-SCNC: 8 MMOL/L (ref 7–16)
APPEARANCE UR: CLEAR
BACTERIA SPEC CULT: NORMAL
BACTERIA URNS QL MICRO: 0 /HPF
BASOPHILS # BLD: 0.1 K/UL (ref 0–0.2)
BASOPHILS NFR BLD: 2 % (ref 0–2)
BILIRUB UR QL: NEGATIVE
BUN SERPL-MCNC: 16 MG/DL (ref 8–23)
CALCIUM SERPL-MCNC: 10.1 MG/DL (ref 8.3–10.4)
CHLORIDE SERPL-SCNC: 108 MMOL/L (ref 98–107)
CO2 SERPL-SCNC: 27 MMOL/L (ref 21–32)
COLOR UR: YELLOW
CREAT SERPL-MCNC: 0.57 MG/DL (ref 0.6–1)
DIFFERENTIAL METHOD BLD: ABNORMAL
EOSINOPHIL # BLD: 0.2 K/UL (ref 0–0.8)
EOSINOPHIL NFR BLD: 4 % (ref 0.5–7.8)
EPI CELLS #/AREA URNS HPF: ABNORMAL /HPF
ERYTHROCYTE [DISTWIDTH] IN BLOOD BY AUTOMATED COUNT: 13.3 % (ref 11.9–14.6)
GLUCOSE SERPL-MCNC: 98 MG/DL (ref 65–100)
GLUCOSE UR STRIP.AUTO-MCNC: NEGATIVE MG/DL
HCT VFR BLD AUTO: 44.6 % (ref 35.8–46.3)
HGB BLD-MCNC: 14.8 G/DL (ref 11.7–15.4)
HGB UR QL STRIP: NEGATIVE
IMM GRANULOCYTES # BLD AUTO: 0 K/UL (ref 0–0.5)
IMM GRANULOCYTES NFR BLD AUTO: 0 % (ref 0–5)
KETONES UR QL STRIP.AUTO: NEGATIVE MG/DL
LEUKOCYTE ESTERASE UR QL STRIP.AUTO: ABNORMAL
LYMPHOCYTES # BLD: 1.8 K/UL (ref 0.5–4.6)
LYMPHOCYTES NFR BLD: 40 % (ref 13–44)
MCH RBC QN AUTO: 30.3 PG (ref 26.1–32.9)
MCHC RBC AUTO-ENTMCNC: 33.2 G/DL (ref 31.4–35)
MCV RBC AUTO: 91.4 FL (ref 79.6–97.8)
MONOCYTES # BLD: 0.6 K/UL (ref 0.1–1.3)
MONOCYTES NFR BLD: 13 % (ref 4–12)
NEUTS SEG # BLD: 1.9 K/UL (ref 1.7–8.2)
NEUTS SEG NFR BLD: 42 % (ref 43–78)
NITRITE UR QL STRIP.AUTO: NEGATIVE
NRBC # BLD: 0 K/UL (ref 0–0.2)
OTHER OBSERVATIONS,UCOM: ABNORMAL
PH UR STRIP: 6 [PH] (ref 5–9)
PLATELET # BLD AUTO: 133 K/UL (ref 150–450)
PMV BLD AUTO: 10.4 FL (ref 9.4–12.3)
POTASSIUM SERPL-SCNC: 3.9 MMOL/L (ref 3.5–5.1)
PROT UR STRIP-MCNC: NEGATIVE MG/DL
RBC # BLD AUTO: 4.88 M/UL (ref 4.05–5.2)
SERVICE CMNT-IMP: NORMAL
SODIUM SERPL-SCNC: 143 MMOL/L (ref 136–145)
SP GR UR REFRACTOMETRY: 1.01 (ref 1–1.02)
UROBILINOGEN UR QL STRIP.AUTO: 1 EU/DL (ref 0.2–1)
WBC # BLD AUTO: 4.5 K/UL (ref 4.3–11.1)
WBC URNS QL MICRO: ABNORMAL /HPF

## 2021-06-16 PROCEDURE — 85025 COMPLETE CBC W/AUTO DIFF WBC: CPT

## 2021-06-16 PROCEDURE — 87641 MR-STAPH DNA AMP PROBE: CPT

## 2021-06-16 PROCEDURE — 77030027138 HC INCENT SPIROMETER -A

## 2021-06-16 PROCEDURE — 80048 BASIC METABOLIC PNL TOTAL CA: CPT

## 2021-06-16 PROCEDURE — 81003 URINALYSIS AUTO W/O SCOPE: CPT

## 2021-06-16 RX ORDER — DIPHENHYDRAMINE HCL 25 MG
2 CAPSULE ORAL AS NEEDED
COMMUNITY

## 2021-06-16 RX ORDER — BIOTIN 5 MG
5 TABLET ORAL DAILY
COMMUNITY

## 2021-06-16 RX ORDER — FLUTICASONE PROPIONATE 50 MCG
2 SPRAY, SUSPENSION (ML) NASAL AS NEEDED
COMMUNITY

## 2021-06-16 NOTE — PERIOP NOTES
PLEASE CONTINUE TAKING ALL PRESCRIPTION MEDICATIONS UP TO THE DAY OF SURGERY UNLESS OTHERWISE DIRECTED BELOW. DISCONTINUE all vitamins and supplements 7 days prior to surgery. DISCONTINUE Non-Steriodal Anti-Inflammatory (NSAIDS) such as Advil and Aleve 5 days prior to surgery. Home Medications to take  the day of surgery   Colchicine/colcrys  Fluticasone/flonase if needed   Gabapentin/neurontin        6/21/21: On the day before surgery please take Acetaminophen 1000mg in the morning and then again before bed. You may substitute for Tylenol 650 mg. Home Medications   to Hold   DISCONTINUE all vitamins and supplements 7 days prior to surgery. DISCONTINUE Non-Steriodal Anti-Inflammatory (NSAIDS) such as Advil and Aleve 5 days prior to surgery. Comments        On the day before surgery please take Acetaminophen 1000mg in the morning and then again before bed. You may substitute for Tylenol 650 mg. Please do not bring home medications with you on the day of surgery unless otherwise directed by your nurse. If you are instructed to bring home medications, please give them to your nurse as they will be administered by the nursing staff. If you have any questions, please call [facility] [phone] or [address] [place] [address] [phone]. A copy of this note was provided to the patient for reference. How to Use Your Incentive Spirometer       About Your Incentive Spirometer  An incentive spirometer is a device that will expand your lungs by helping you to breathe more deeply and fully. The parts of your incentive spirometer are labeled in Figure 1. Using your incentive spirometer  When youre using your incentive spirometer, make sure to breathe through your mouth. If you breathe through your nose, the incentive spirometer wont work properly. You can hold your nose if you have trouble. DO NOT BLOW INTO THE DEVICE.  If you feel dizzy at any time, stop and rest. Try again at a later time. 1. Sit upright in a chair or in bed. Hold the incentive spirometer at eye level. 2. Put the mouthpiece in your mouth and close your lips tightly around it. Slowly breathe out (exhale) completely. 3. Breathe in (inhale) slowly through your mouth as deeply as you can. As you take the breath, you will see the piston rise inside the large column. While the piston rises, the indicator on the right should move upwards. It should stay in between the 2 arrows (see Figure 1). 4. Try to get the piston as high as you can, while keeping the indicator between the arrows. If the indicator doesnt stay between the arrows, youre breathing either too fast or too slow. 5. When you get it as high as you can, hold your breath for 10 seconds, or as long as possible. While youre holding your breath, the piston will slowly fall to the base of the spirometer. 6. Once the piston reaches the bottom of the spirometer, breathe out slowly through your mouth. Rest for a few seconds. 7. Repeat 10 times. Try to get the piston to the same level with each breath. 8. After each set of 10 breaths, try to cough as coughing will help loosen or clear any mucus in your lungs. 9. Put the marker at the level the piston reached on your incentive spirometer. This will be your goal next time. Repeat these steps every hour that youre awake.   Cover the mouthpiece of the incentive spirometer when you arent using it

## 2021-06-16 NOTE — PERIOP NOTES
Recent Results (from the past 12 hour(s))   CBC WITH AUTOMATED DIFF    Collection Time: 06/16/21  2:24 PM   Result Value Ref Range    WBC 4.5 4.3 - 11.1 K/uL    RBC 4.88 4.05 - 5.2 M/uL    HGB 14.8 11.7 - 15.4 g/dL    HCT 44.6 35.8 - 46.3 %    MCV 91.4 79.6 - 97.8 FL    MCH 30.3 26.1 - 32.9 PG    MCHC 33.2 31.4 - 35.0 g/dL    RDW 13.3 11.9 - 14.6 %    PLATELET 377 (L) 967 - 450 K/uL    MPV 10.4 9.4 - 12.3 FL    ABSOLUTE NRBC 0.00 0.0 - 0.2 K/uL    DF AUTOMATED      NEUTROPHILS 42 (L) 43 - 78 %    LYMPHOCYTES 40 13 - 44 %    MONOCYTES 13 (H) 4.0 - 12.0 %    EOSINOPHILS 4 0.5 - 7.8 %    BASOPHILS 2 0.0 - 2.0 %    IMMATURE GRANULOCYTES 0 0.0 - 5.0 %    ABS. NEUTROPHILS 1.9 1.7 - 8.2 K/UL    ABS. LYMPHOCYTES 1.8 0.5 - 4.6 K/UL    ABS. MONOCYTES 0.6 0.1 - 1.3 K/UL    ABS. EOSINOPHILS 0.2 0.0 - 0.8 K/UL    ABS. BASOPHILS 0.1 0.0 - 0.2 K/UL    ABS. IMM. GRANS. 0.0 0.0 - 0.5 K/UL   URINALYSIS W/ RFLX MICROSCOPIC    Collection Time: 06/16/21  2:30 PM   Result Value Ref Range    Color YELLOW      Appearance CLEAR      Specific gravity 1.010 1.001 - 1.023      pH (UA) 6.0 5.0 - 9.0      Protein Negative NEG mg/dL    Glucose Negative NEG mg/dL    Ketone Negative NEG mg/dL    Bilirubin Negative NEG      Blood Negative NEG      Urobilinogen 1.0 0.2 - 1.0 EU/dL    Nitrites Negative NEG      Leukocyte Esterase TRACE (A) NEG      WBC 0-3 0 /hpf    Epithelial cells 0-3 0 /hpf    Bacteria 0 0 /hpf    Other observations RESULTS VERIFIED MANUALLY       Labs reviewed. Routing to surgeon. BMP and MRSA still in process; will route when resulted 6/17/2021. Thanks!

## 2021-06-16 NOTE — PERIOP NOTES
Patient verified name, , and surgery as listed in [facility]. Patient provided medical/health information and PTA medications to the best of their ability. TYPE  CASE: 3  Orders per surgeon: received  Labs per surgeon: MSSA/MRSA. Labs per anesthesia protocol: CBC with diff, BMP, UA, T&S DOS. EKG:  Not indicated per protocol; patient has NO CAD, PVD, CHF, cerebrovascular disease, no DM or CRI. Patient has no c/o CP or SOB. Patient COVID test date N/A; Patient has had both covid vaccines: Larose Peter, given on 2021 and 2/10/2021. Per policy patient does not require pre-surgery covid testing. Nasal Swab collected per MD order. Patient provided with and instructed on education handouts including Guide to Surgery, blood transfusions, pain management, and hand hygiene for the family and community, and [facility] brochure. Road to Recovery Spine surgery patient guide given. Instructed on incentive spirometry with return demonstration. Patient viewed spine prehab video. Hibiclens and instructions given per hospital policy. Original medication prescription list visualized and reconciled with patient during patient appointment. Patient teach back successful and patient demonstrates knowledge of instruction.

## 2021-06-21 ENCOUNTER — ANESTHESIA EVENT (OUTPATIENT)
Dept: SURGERY | Age: 76
End: 2021-06-21
Payer: MEDICARE

## 2021-06-22 ENCOUNTER — ANESTHESIA (OUTPATIENT)
Dept: SURGERY | Age: 76
End: 2021-06-22
Payer: MEDICARE

## 2021-06-22 ENCOUNTER — HOSPITAL ENCOUNTER (OUTPATIENT)
Age: 76
Discharge: HOME | End: 2021-06-23
Attending: ORTHOPAEDIC SURGERY | Admitting: ORTHOPAEDIC SURGERY
Payer: MEDICARE

## 2021-06-22 ENCOUNTER — APPOINTMENT (OUTPATIENT)
Dept: GENERAL RADIOLOGY | Age: 76
End: 2021-06-22
Attending: ORTHOPAEDIC SURGERY
Payer: MEDICARE

## 2021-06-22 DIAGNOSIS — Z98.1 STATUS POST LUMBAR SPINAL ARTHRODESIS: Primary | ICD-10-CM

## 2021-06-22 DIAGNOSIS — M48.062 LUMBAR STENOSIS WITH NEUROGENIC CLAUDICATION: ICD-10-CM

## 2021-06-22 PROBLEM — G95.19 NEUROGENIC CLAUDICATION (HCC): Status: ACTIVE | Noted: 2021-06-22

## 2021-06-22 PROBLEM — M43.16 SPONDYLOLISTHESIS OF LUMBAR REGION: Status: ACTIVE | Noted: 2021-06-22

## 2021-06-22 LAB
ABO + RH BLD: NORMAL
BLOOD GROUP ANTIBODIES SERPL: NORMAL
SPECIMEN EXP DATE BLD: NORMAL

## 2021-06-22 PROCEDURE — 77030037155 HC BLCKR SPN CAP LOK AXI STRY -B: Performed by: ORTHOPAEDIC SURGERY

## 2021-06-22 PROCEDURE — 97535 SELF CARE MNGMENT TRAINING: CPT

## 2021-06-22 PROCEDURE — 77030012894: Performed by: ORTHOPAEDIC SURGERY

## 2021-06-22 PROCEDURE — 76010000172 HC OR TIME 2.5 TO 3 HR INTENSV-TIER 1: Performed by: ORTHOPAEDIC SURGERY

## 2021-06-22 PROCEDURE — 77030034475 HC MISC IMPL SPN: Performed by: ORTHOPAEDIC SURGERY

## 2021-06-22 PROCEDURE — 22558 ARTHRD ANT NTRBD MIN DSC LUM: CPT | Performed by: ORTHOPAEDIC SURGERY

## 2021-06-22 PROCEDURE — 77030019557 HC ELECTRD VES SEAL MEDT -F: Performed by: ORTHOPAEDIC SURGERY

## 2021-06-22 PROCEDURE — 77030029099 HC BN WAX SSPC -A: Performed by: ORTHOPAEDIC SURGERY

## 2021-06-22 PROCEDURE — 77030040922 HC BLNKT HYPOTHRM STRY -A: Performed by: ANESTHESIOLOGY

## 2021-06-22 PROCEDURE — 77030031139 HC SUT VCRL2 J&J -A: Performed by: ORTHOPAEDIC SURGERY

## 2021-06-22 PROCEDURE — 77030020269 HC MISC IMPL: Performed by: ORTHOPAEDIC SURGERY

## 2021-06-22 PROCEDURE — 97161 PT EVAL LOW COMPLEX 20 MIN: CPT

## 2021-06-22 PROCEDURE — 97530 THERAPEUTIC ACTIVITIES: CPT

## 2021-06-22 PROCEDURE — 74011250637 HC RX REV CODE- 250/637: Performed by: NURSE ANESTHETIST, CERTIFIED REGISTERED

## 2021-06-22 PROCEDURE — 74011250637 HC RX REV CODE- 250/637: Performed by: ORTHOPAEDIC SURGERY

## 2021-06-22 PROCEDURE — 74011000250 HC RX REV CODE- 250: Performed by: NURSE ANESTHETIST, CERTIFIED REGISTERED

## 2021-06-22 PROCEDURE — 77030028270 HC SRGFL HEMSTAT MTRX J&J -C: Performed by: ORTHOPAEDIC SURGERY

## 2021-06-22 PROCEDURE — C1713 ANCHOR/SCREW BN/BN,TIS/BN: HCPCS | Performed by: ORTHOPAEDIC SURGERY

## 2021-06-22 PROCEDURE — 77030037158 HC BIT DRL SPN XIA DISP STRY -C: Performed by: ORTHOPAEDIC SURGERY

## 2021-06-22 PROCEDURE — 20930 SP BONE ALGRFT MORSEL ADD-ON: CPT | Performed by: ORTHOPAEDIC SURGERY

## 2021-06-22 PROCEDURE — 77030037157 HC TAP SPN MOD XIA DISP STRY -C: Performed by: ORTHOPAEDIC SURGERY

## 2021-06-22 PROCEDURE — 77030041392 HC GRFT BN PROT GRWTH FCTR BSYC -G1: Performed by: ORTHOPAEDIC SURGERY

## 2021-06-22 PROCEDURE — 77030020268 HC MISC GENERAL SUPPLY: Performed by: ORTHOPAEDIC SURGERY

## 2021-06-22 PROCEDURE — 22840 INSERT SPINE FIXATION DEVICE: CPT | Performed by: ORTHOPAEDIC SURGERY

## 2021-06-22 PROCEDURE — 63047 LAM FACETEC & FORAMOT LUMBAR: CPT | Performed by: ORTHOPAEDIC SURGERY

## 2021-06-22 PROCEDURE — 77030034479 HC ADH SKN CLSR PRINEO J&J -B: Performed by: ORTHOPAEDIC SURGERY

## 2021-06-22 PROCEDURE — 77030020407 HC IV BLD WRMR ST 3M -A: Performed by: ANESTHESIOLOGY

## 2021-06-22 PROCEDURE — 77030039425 HC BLD LARYNG TRULITE DISP TELE -A: Performed by: ANESTHESIOLOGY

## 2021-06-22 PROCEDURE — 74011250636 HC RX REV CODE- 250/636: Performed by: NURSE ANESTHETIST, CERTIFIED REGISTERED

## 2021-06-22 PROCEDURE — 77030025623 HC BUR RND PRECIS STRY -D: Performed by: ORTHOPAEDIC SURGERY

## 2021-06-22 PROCEDURE — 77030018673: Performed by: ORTHOPAEDIC SURGERY

## 2021-06-22 PROCEDURE — 2709999900 HC NON-CHARGEABLE SUPPLY

## 2021-06-22 PROCEDURE — 63048 LAM FACETEC &FORAMOT EA ADDL: CPT | Performed by: ORTHOPAEDIC SURGERY

## 2021-06-22 PROCEDURE — 74011250636 HC RX REV CODE- 250/636: Performed by: ORTHOPAEDIC SURGERY

## 2021-06-22 PROCEDURE — 72100 X-RAY EXAM L-S SPINE 2/3 VWS: CPT

## 2021-06-22 PROCEDURE — 22853 INSJ BIOMECHANICAL DEVICE: CPT | Performed by: ORTHOPAEDIC SURGERY

## 2021-06-22 PROCEDURE — 77030032817 HC GRFT BN CHIPS STRY -C: Performed by: ORTHOPAEDIC SURGERY

## 2021-06-22 PROCEDURE — 77030040830 HC CATH URETH FOL MDII -A: Performed by: ORTHOPAEDIC SURGERY

## 2021-06-22 PROCEDURE — 76060000036 HC ANESTHESIA 2.5 TO 3 HR: Performed by: ORTHOPAEDIC SURGERY

## 2021-06-22 PROCEDURE — 2709999900 HC NON-CHARGEABLE SUPPLY: Performed by: ORTHOPAEDIC SURGERY

## 2021-06-22 PROCEDURE — 74011000250 HC RX REV CODE- 250: Performed by: ORTHOPAEDIC SURGERY

## 2021-06-22 PROCEDURE — 74011250636 HC RX REV CODE- 250/636: Performed by: ANESTHESIOLOGY

## 2021-06-22 PROCEDURE — 74011250637 HC RX REV CODE- 250/637: Performed by: ANESTHESIOLOGY

## 2021-06-22 PROCEDURE — 77030037088 HC TUBE ENDOTRACH ORAL NSL COVD-A: Performed by: ANESTHESIOLOGY

## 2021-06-22 PROCEDURE — 22612 ARTHRD PST TQ 1NTRSPC LUMBAR: CPT | Performed by: ORTHOPAEDIC SURGERY

## 2021-06-22 PROCEDURE — 74011000272 HC RX REV CODE- 272: Performed by: ORTHOPAEDIC SURGERY

## 2021-06-22 PROCEDURE — 77030019908 HC STETH ESOPH SIMS -A: Performed by: ANESTHESIOLOGY

## 2021-06-22 PROCEDURE — 86901 BLOOD TYPING SEROLOGIC RH(D): CPT

## 2021-06-22 PROCEDURE — 76210000016 HC OR PH I REC 1 TO 1.5 HR: Performed by: ORTHOPAEDIC SURGERY

## 2021-06-22 PROCEDURE — 77030040361 HC SLV COMPR DVT MDII -B: Performed by: ORTHOPAEDIC SURGERY

## 2021-06-22 PROCEDURE — 97165 OT EVAL LOW COMPLEX 30 MIN: CPT

## 2021-06-22 PROCEDURE — 77030038552 HC DRN WND MDII -A: Performed by: ORTHOPAEDIC SURGERY

## 2021-06-22 DEVICE — POLYAXIAL CORTICAL SCREW
Type: IMPLANTABLE DEVICE | Site: SPINE LUMBAR | Status: FUNCTIONAL
Brand: XIA 4.5 SYSTEM -  XIA CT

## 2021-06-22 DEVICE — LATERAL INTERBODY, SIZE 18X50X12 MM, 8 DEG.
Type: IMPLANTABLE DEVICE | Site: SPINE LUMBAR | Status: FUNCTIONAL
Brand: CASCADIA INTERBODY SYSTEM

## 2021-06-22 DEVICE — BLOCKER
Type: IMPLANTABLE DEVICE | Site: SPINE LUMBAR | Status: FUNCTIONAL
Brand: XIA 4.5 SYSTEM -  XIA CT

## 2021-06-22 DEVICE — BIO DBM PLUS PUTTY (WITH CANCELLOUS)
Type: IMPLANTABLE DEVICE | Site: SPINE LUMBAR | Status: FUNCTIONAL
Brand: BIO DBM

## 2021-06-22 DEVICE — VITALLIUM PREBENT AND PRECUT ROD WITHOUT HEX
Type: IMPLANTABLE DEVICE | Site: SPINE LUMBAR | Status: FUNCTIONAL
Brand: XIA 4 5

## 2021-06-22 RX ORDER — GLYCOPYRROLATE 0.2 MG/ML
INJECTION INTRAMUSCULAR; INTRAVENOUS AS NEEDED
Status: DISCONTINUED | OUTPATIENT
Start: 2021-06-22 | End: 2021-06-22 | Stop reason: HOSPADM

## 2021-06-22 RX ORDER — SODIUM CHLORIDE 0.9 % (FLUSH) 0.9 %
5-40 SYRINGE (ML) INJECTION AS NEEDED
Status: DISCONTINUED | OUTPATIENT
Start: 2021-06-22 | End: 2021-06-22 | Stop reason: HOSPADM

## 2021-06-22 RX ORDER — MORPHINE SULFATE 2 MG/ML
2 INJECTION, SOLUTION INTRAMUSCULAR; INTRAVENOUS
Status: DISCONTINUED | OUTPATIENT
Start: 2021-06-22 | End: 2021-06-23 | Stop reason: HOSPADM

## 2021-06-22 RX ORDER — ROCURONIUM BROMIDE 10 MG/ML
INJECTION, SOLUTION INTRAVENOUS AS NEEDED
Status: DISCONTINUED | OUTPATIENT
Start: 2021-06-22 | End: 2021-06-22 | Stop reason: HOSPADM

## 2021-06-22 RX ORDER — NALOXONE HYDROCHLORIDE 0.4 MG/ML
0.2 INJECTION, SOLUTION INTRAMUSCULAR; INTRAVENOUS; SUBCUTANEOUS AS NEEDED
Status: DISCONTINUED | OUTPATIENT
Start: 2021-06-22 | End: 2021-06-22 | Stop reason: HOSPADM

## 2021-06-22 RX ORDER — MIDAZOLAM HYDROCHLORIDE 1 MG/ML
2 INJECTION, SOLUTION INTRAMUSCULAR; INTRAVENOUS
Status: DISCONTINUED | OUTPATIENT
Start: 2021-06-22 | End: 2021-06-22 | Stop reason: HOSPADM

## 2021-06-22 RX ORDER — LIDOCAINE HYDROCHLORIDE 10 MG/ML
0.1 INJECTION INFILTRATION; PERINEURAL AS NEEDED
Status: DISCONTINUED | OUTPATIENT
Start: 2021-06-22 | End: 2021-06-22 | Stop reason: HOSPADM

## 2021-06-22 RX ORDER — SODIUM CHLORIDE, SODIUM LACTATE, POTASSIUM CHLORIDE, CALCIUM CHLORIDE 600; 310; 30; 20 MG/100ML; MG/100ML; MG/100ML; MG/100ML
75 INJECTION, SOLUTION INTRAVENOUS CONTINUOUS
Status: DISPENSED | OUTPATIENT
Start: 2021-06-22 | End: 2021-06-23

## 2021-06-22 RX ORDER — DOCUSATE SODIUM 100 MG/1
100 CAPSULE, LIQUID FILLED ORAL 2 TIMES DAILY
Status: DISCONTINUED | OUTPATIENT
Start: 2021-06-22 | End: 2021-06-23 | Stop reason: HOSPADM

## 2021-06-22 RX ORDER — SODIUM CHLORIDE 0.9 % (FLUSH) 0.9 %
5-40 SYRINGE (ML) INJECTION EVERY 8 HOURS
Status: DISCONTINUED | OUTPATIENT
Start: 2021-06-22 | End: 2021-06-22 | Stop reason: HOSPADM

## 2021-06-22 RX ORDER — FAMOTIDINE 20 MG/1
20 TABLET, FILM COATED ORAL EVERY 12 HOURS
Status: DISCONTINUED | OUTPATIENT
Start: 2021-06-22 | End: 2021-06-23 | Stop reason: HOSPADM

## 2021-06-22 RX ORDER — LIDOCAINE HYDROCHLORIDE 20 MG/ML
INJECTION, SOLUTION EPIDURAL; INFILTRATION; INTRACAUDAL; PERINEURAL AS NEEDED
Status: DISCONTINUED | OUTPATIENT
Start: 2021-06-22 | End: 2021-06-22 | Stop reason: HOSPADM

## 2021-06-22 RX ORDER — SODIUM CHLORIDE 0.9 % (FLUSH) 0.9 %
5-40 SYRINGE (ML) INJECTION EVERY 8 HOURS
Status: DISCONTINUED | OUTPATIENT
Start: 2021-06-22 | End: 2021-06-23 | Stop reason: HOSPADM

## 2021-06-22 RX ORDER — SODIUM CHLORIDE, SODIUM LACTATE, POTASSIUM CHLORIDE, CALCIUM CHLORIDE 600; 310; 30; 20 MG/100ML; MG/100ML; MG/100ML; MG/100ML
25 INJECTION, SOLUTION INTRAVENOUS CONTINUOUS
Status: DISCONTINUED | OUTPATIENT
Start: 2021-06-22 | End: 2021-06-22 | Stop reason: HOSPADM

## 2021-06-22 RX ORDER — ACETAMINOPHEN 325 MG/1
650 TABLET ORAL EVERY 6 HOURS
Status: DISCONTINUED | OUTPATIENT
Start: 2021-06-22 | End: 2021-06-23 | Stop reason: HOSPADM

## 2021-06-22 RX ORDER — CEFAZOLIN SODIUM/WATER 2 G/20 ML
2 SYRINGE (ML) INTRAVENOUS EVERY 8 HOURS
Status: COMPLETED | OUTPATIENT
Start: 2021-06-22 | End: 2021-06-23

## 2021-06-22 RX ORDER — COLCHICINE 0.6 MG/1
0.3 TABLET ORAL 2 TIMES DAILY
Status: DISCONTINUED | OUTPATIENT
Start: 2021-06-22 | End: 2021-06-23 | Stop reason: HOSPADM

## 2021-06-22 RX ORDER — ONDANSETRON 2 MG/ML
4 INJECTION INTRAMUSCULAR; INTRAVENOUS
Status: DISCONTINUED | OUTPATIENT
Start: 2021-06-22 | End: 2021-06-23 | Stop reason: HOSPADM

## 2021-06-22 RX ORDER — VANCOMYCIN HYDROCHLORIDE 1 G/20ML
INJECTION, POWDER, LYOPHILIZED, FOR SOLUTION INTRAVENOUS AS NEEDED
Status: DISCONTINUED | OUTPATIENT
Start: 2021-06-22 | End: 2021-06-22 | Stop reason: HOSPADM

## 2021-06-22 RX ORDER — NEOSTIGMINE METHYLSULFATE 1 MG/ML
INJECTION, SOLUTION INTRAVENOUS AS NEEDED
Status: DISCONTINUED | OUTPATIENT
Start: 2021-06-22 | End: 2021-06-22 | Stop reason: HOSPADM

## 2021-06-22 RX ORDER — SODIUM CHLORIDE 0.9 % (FLUSH) 0.9 %
5-40 SYRINGE (ML) INJECTION AS NEEDED
Status: DISCONTINUED | OUTPATIENT
Start: 2021-06-22 | End: 2021-06-23 | Stop reason: HOSPADM

## 2021-06-22 RX ORDER — OXYCODONE AND ACETAMINOPHEN 5; 325 MG/1; MG/1
1 TABLET ORAL AS NEEDED
Status: DISCONTINUED | OUTPATIENT
Start: 2021-06-22 | End: 2021-06-22 | Stop reason: HOSPADM

## 2021-06-22 RX ORDER — ONDANSETRON 2 MG/ML
INJECTION INTRAMUSCULAR; INTRAVENOUS AS NEEDED
Status: DISCONTINUED | OUTPATIENT
Start: 2021-06-22 | End: 2021-06-22 | Stop reason: HOSPADM

## 2021-06-22 RX ORDER — CEFAZOLIN SODIUM/WATER 2 G/20 ML
2 SYRINGE (ML) INTRAVENOUS ONCE
Status: COMPLETED | OUTPATIENT
Start: 2021-06-22 | End: 2021-06-23

## 2021-06-22 RX ORDER — SODIUM CHLORIDE, SODIUM LACTATE, POTASSIUM CHLORIDE, CALCIUM CHLORIDE 600; 310; 30; 20 MG/100ML; MG/100ML; MG/100ML; MG/100ML
75 INJECTION, SOLUTION INTRAVENOUS CONTINUOUS
Status: DISCONTINUED | OUTPATIENT
Start: 2021-06-22 | End: 2021-06-22 | Stop reason: HOSPADM

## 2021-06-22 RX ORDER — HYDROMORPHONE HYDROCHLORIDE 1 MG/ML
0.5 INJECTION, SOLUTION INTRAMUSCULAR; INTRAVENOUS; SUBCUTANEOUS
Status: DISCONTINUED | OUTPATIENT
Start: 2021-06-22 | End: 2021-06-22 | Stop reason: HOSPADM

## 2021-06-22 RX ORDER — PROPOFOL 10 MG/ML
INJECTION, EMULSION INTRAVENOUS AS NEEDED
Status: DISCONTINUED | OUTPATIENT
Start: 2021-06-22 | End: 2021-06-22 | Stop reason: HOSPADM

## 2021-06-22 RX ORDER — ACETAMINOPHEN 500 MG
1000 TABLET ORAL ONCE
Status: COMPLETED | OUTPATIENT
Start: 2021-06-22 | End: 2021-06-22

## 2021-06-22 RX ORDER — FENTANYL CITRATE 50 UG/ML
INJECTION, SOLUTION INTRAMUSCULAR; INTRAVENOUS AS NEEDED
Status: DISCONTINUED | OUTPATIENT
Start: 2021-06-22 | End: 2021-06-22 | Stop reason: HOSPADM

## 2021-06-22 RX ORDER — GABAPENTIN 100 MG/1
100 CAPSULE ORAL 2 TIMES DAILY
Status: DISCONTINUED | OUTPATIENT
Start: 2021-06-22 | End: 2021-06-23 | Stop reason: HOSPADM

## 2021-06-22 RX ORDER — NALOXONE HYDROCHLORIDE 0.4 MG/ML
0.4 INJECTION, SOLUTION INTRAMUSCULAR; INTRAVENOUS; SUBCUTANEOUS
Status: DISCONTINUED | OUTPATIENT
Start: 2021-06-22 | End: 2021-06-23 | Stop reason: HOSPADM

## 2021-06-22 RX ORDER — SUFENTANIL CITRATE 50 UG/ML
INJECTION EPIDURAL; INTRAVENOUS
Status: DISCONTINUED | OUTPATIENT
Start: 2021-06-22 | End: 2021-06-22 | Stop reason: HOSPADM

## 2021-06-22 RX ORDER — OXYCODONE HYDROCHLORIDE 5 MG/1
5-10 TABLET ORAL
Status: DISCONTINUED | OUTPATIENT
Start: 2021-06-22 | End: 2021-06-23 | Stop reason: HOSPADM

## 2021-06-22 RX ORDER — SODIUM CHLORIDE 0.9 G/100ML
IRRIGANT IRRIGATION AS NEEDED
Status: DISCONTINUED | OUTPATIENT
Start: 2021-06-22 | End: 2021-06-22 | Stop reason: HOSPADM

## 2021-06-22 RX ORDER — DEXAMETHASONE SODIUM PHOSPHATE 4 MG/ML
INJECTION, SOLUTION INTRA-ARTICULAR; INTRALESIONAL; INTRAMUSCULAR; INTRAVENOUS; SOFT TISSUE AS NEEDED
Status: DISCONTINUED | OUTPATIENT
Start: 2021-06-22 | End: 2021-06-22 | Stop reason: HOSPADM

## 2021-06-22 RX ORDER — SODIUM CHLORIDE, SODIUM LACTATE, POTASSIUM CHLORIDE, CALCIUM CHLORIDE 600; 310; 30; 20 MG/100ML; MG/100ML; MG/100ML; MG/100ML
INJECTION, SOLUTION INTRAVENOUS
Status: DISCONTINUED | OUTPATIENT
Start: 2021-06-22 | End: 2021-06-22 | Stop reason: HOSPADM

## 2021-06-22 RX ADMIN — ONDANSETRON 4 MG: 2 INJECTION INTRAMUSCULAR; INTRAVENOUS at 08:39

## 2021-06-22 RX ADMIN — MINERAL OIL AND WHITE PETROLATUM 1 EACH: 150; 830 OINTMENT OPHTHALMIC at 08:03

## 2021-06-22 RX ADMIN — SODIUM CHLORIDE, SODIUM LACTATE, POTASSIUM CHLORIDE, AND CALCIUM CHLORIDE: 600; 310; 30; 20 INJECTION, SOLUTION INTRAVENOUS at 07:56

## 2021-06-22 RX ADMIN — OXYCODONE HYDROCHLORIDE 5 MG: 5 TABLET ORAL at 22:44

## 2021-06-22 RX ADMIN — SODIUM CHLORIDE, SODIUM LACTATE, POTASSIUM CHLORIDE, AND CALCIUM CHLORIDE 75 ML/HR: 600; 310; 30; 20 INJECTION, SOLUTION INTRAVENOUS at 13:32

## 2021-06-22 RX ADMIN — Medication 1 AMPULE: at 13:14

## 2021-06-22 RX ADMIN — GLYCOPYRROLATE 0.2 MG: 0.2 INJECTION, SOLUTION INTRAMUSCULAR; INTRAVENOUS at 08:43

## 2021-06-22 RX ADMIN — COLCHICINE 0.3 MG: 0.6 TABLET, FILM COATED ORAL at 17:17

## 2021-06-22 RX ADMIN — CEFAZOLIN SODIUM 2 G: 100 INJECTION, POWDER, LYOPHILIZED, FOR SOLUTION INTRAVENOUS at 17:26

## 2021-06-22 RX ADMIN — PROPOFOL 75 MCG/KG/MIN: 10 INJECTION, EMULSION INTRAVENOUS at 07:55

## 2021-06-22 RX ADMIN — PROPOFOL 200 MG: 10 INJECTION, EMULSION INTRAVENOUS at 07:45

## 2021-06-22 RX ADMIN — CEFAZOLIN 2 G: 1 INJECTION, POWDER, FOR SOLUTION INTRAVENOUS at 08:15

## 2021-06-22 RX ADMIN — ACETAMINOPHEN 650 MG: 325 TABLET ORAL at 13:14

## 2021-06-22 RX ADMIN — ROCURONIUM BROMIDE 20 MG: 10 INJECTION, SOLUTION INTRAVENOUS at 08:56

## 2021-06-22 RX ADMIN — DEXAMETHASONE SODIUM PHOSPHATE 10 MG: 4 INJECTION, SOLUTION INTRAMUSCULAR; INTRAVENOUS at 08:06

## 2021-06-22 RX ADMIN — GLYCOPYRROLATE 0.4 MG: 0.2 INJECTION, SOLUTION INTRAMUSCULAR; INTRAVENOUS at 10:00

## 2021-06-22 RX ADMIN — HYDROMORPHONE HYDROCHLORIDE 0.5 MG: 1 INJECTION, SOLUTION INTRAMUSCULAR; INTRAVENOUS; SUBCUTANEOUS at 11:20

## 2021-06-22 RX ADMIN — ACETAMINOPHEN 1000 MG: 500 TABLET ORAL at 06:06

## 2021-06-22 RX ADMIN — DOCUSATE SODIUM 100 MG: 100 CAPSULE, LIQUID FILLED ORAL at 17:17

## 2021-06-22 RX ADMIN — ROCURONIUM BROMIDE 10 MG: 10 INJECTION, SOLUTION INTRAVENOUS at 07:46

## 2021-06-22 RX ADMIN — SUFENTANIL CITRATE 0.25 MCG/KG/HR: 50 INJECTION EPIDURAL; INTRAVENOUS at 07:55

## 2021-06-22 RX ADMIN — HYDROMORPHONE HYDROCHLORIDE 0.5 MG: 1 INJECTION, SOLUTION INTRAMUSCULAR; INTRAVENOUS; SUBCUTANEOUS at 11:25

## 2021-06-22 RX ADMIN — LIDOCAINE HYDROCHLORIDE 70 MG: 20 INJECTION, SOLUTION EPIDURAL; INFILTRATION; INTRACAUDAL; PERINEURAL at 07:45

## 2021-06-22 RX ADMIN — Medication 3 MG: at 10:00

## 2021-06-22 RX ADMIN — PROPOFOL 50 MG: 10 INJECTION, EMULSION INTRAVENOUS at 07:48

## 2021-06-22 RX ADMIN — DOCUSATE SODIUM 100 MG: 100 CAPSULE, LIQUID FILLED ORAL at 13:14

## 2021-06-22 RX ADMIN — OXYCODONE HYDROCHLORIDE 10 MG: 5 TABLET ORAL at 17:16

## 2021-06-22 RX ADMIN — SODIUM CHLORIDE, SODIUM LACTATE, POTASSIUM CHLORIDE, AND CALCIUM CHLORIDE 25 ML/HR: 600; 310; 30; 20 INJECTION, SOLUTION INTRAVENOUS at 05:55

## 2021-06-22 RX ADMIN — HYDROMORPHONE HYDROCHLORIDE 0.5 MG: 1 INJECTION, SOLUTION INTRAMUSCULAR; INTRAVENOUS; SUBCUTANEOUS at 11:10

## 2021-06-22 RX ADMIN — Medication 3 AMPULE: at 06:06

## 2021-06-22 RX ADMIN — PHENYLEPHRINE HYDROCHLORIDE 120 MCG: 10 INJECTION INTRAVENOUS at 10:01

## 2021-06-22 RX ADMIN — Medication 10 ML: at 21:53

## 2021-06-22 RX ADMIN — ACETAMINOPHEN 650 MG: 325 TABLET ORAL at 17:16

## 2021-06-22 RX ADMIN — FENTANYL CITRATE 50 MCG: 50 INJECTION INTRAMUSCULAR; INTRAVENOUS at 07:45

## 2021-06-22 RX ADMIN — PHENYLEPHRINE HYDROCHLORIDE 120 MCG: 10 INJECTION INTRAVENOUS at 09:30

## 2021-06-22 RX ADMIN — HYDROMORPHONE HYDROCHLORIDE 0.5 MG: 1 INJECTION, SOLUTION INTRAMUSCULAR; INTRAVENOUS; SUBCUTANEOUS at 11:00

## 2021-06-22 RX ADMIN — PHENYLEPHRINE HYDROCHLORIDE 120 MCG: 10 INJECTION INTRAVENOUS at 09:48

## 2021-06-22 RX ADMIN — Medication 1 AMPULE: at 21:52

## 2021-06-22 RX ADMIN — Medication 10 ML: at 13:35

## 2021-06-22 RX ADMIN — FAMOTIDINE 20 MG: 20 TABLET, FILM COATED ORAL at 21:52

## 2021-06-22 RX ADMIN — GABAPENTIN 100 MG: 100 CAPSULE ORAL at 17:17

## 2021-06-22 NOTE — ANESTHESIA POSTPROCEDURE EVALUATION
Procedure(s):  L4-L5 DIRECT LATERAL FUSION W/ INTERBODY SPACER AND ALLOGRAFT(DLIF)  LEFT S1 HEMILAMINECTOMY AND L4-L5 LAMINECTOMY AND FUSION W/ ALLOGRAFT AND INSTRUMENTATION. general    Anesthesia Post Evaluation      Multimodal analgesia: multimodal analgesia used between 6 hours prior to anesthesia start to PACU discharge  Patient location during evaluation: PACU  Patient participation: complete - patient participated  Level of consciousness: awake and alert  Pain management: adequate  Airway patency: patent  Anesthetic complications: no  Cardiovascular status: acceptable  Respiratory status: acceptable  Hydration status: acceptable  Post anesthesia nausea and vomiting:  none  Final Post Anesthesia Temperature Assessment:  Normothermia (36.0-37.5 degrees C)      INITIAL Post-op Vital signs:   Vitals Value Taken Time   /79 06/22/21 1139   Temp 36.7 °C (98 °F) 06/22/21 1119   Pulse 61 06/22/21 1145   Resp 17 06/22/21 1139   SpO2 98 % 06/22/21 1145   Vitals shown include unvalidated device data.

## 2021-06-22 NOTE — ANESTHESIA PREPROCEDURE EVALUATION
Relevant Problems   No relevant active problems       Anesthetic History               Review of Systems / Medical History      Pulmonary        Sleep apnea: CPAP           Neuro/Psych              Cardiovascular              Hyperlipidemia    Exercise tolerance: >4 METS     GI/Hepatic/Renal     GERD: well controlled      Liver disease (ElevatedLFTs)     Endo/Other        Cancer (breast CA: [id number]; has had bilat mastectomy; IV starts and BPs in left arm are allowed )     Other Findings            Physical Exam    Airway  Mallampati: II  TM Distance: 4 - 6 cm  Neck ROM: normal range of motion   Mouth opening: Normal     Cardiovascular    Rhythm: regular  Rate: normal         Dental  No notable dental hx       Pulmonary  Breath sounds clear to auscultation               Abdominal  GI exam deferred       Other Findings            Anesthetic Plan    ASA: 2  Anesthesia type: general          Induction: Intravenous  Anesthetic plan and risks discussed with: Patient

## 2021-06-22 NOTE — PROGRESS NOTES
ACUTE OT GOALS:  (Developed with and agreed upon by patient and/or caregiver.)  1. Patient will verbalize and demonstrate understanding of spinal precautions with 100% accuracy during ADLs. 2. Patient will complete lower body bathing and dressing with modified independence and adaptive equipment as needed. 3. Patient will complete functional transfers with modified independence and adaptive equipment as needed. 4. Patient will complete toileting and toilet transfer with modified independence. 5. Patient will complete functional mobility of household distances with modified independence and adaptive equipment as needed.    6. Patient will demonstrate ability to log roll in bed with independence without verbal cues from therapist.     Timeframe: 7 visits     OCCUPATIONAL THERAPY ASSESSMENT: Initial Assessment and Daily Note OT Treatment Day # 1    Juliane Armstrong is a 67 y.o. female   PRIMARY DIAGNOSIS: Spondylolisthesis of lumbar region  Neurogenic claudication [M48.062]  Spondylolisthesis of lumbar region [M43.16]  Lumbar radiculopathy [M54.16]  Status post lumbar spinal arthrodesis [Z98.1]  Procedure(s) (LRB):  L4-L5 DIRECT LATERAL FUSION W/ INTERBODY SPACER AND ALLOGRAFT(DLIF) (Left)  LEFT S1 HEMILAMINECTOMY AND L4-L5 LAMINECTOMY AND FUSION W/ ALLOGRAFT AND INSTRUMENTATION (Left)  Day of Surgery  Reason for Referral:    ICD-10: Treatment Diagnosis: Generalized Muscle Weakness (M62.81)  Other lack of cordination (R27.8)  Difficulty in walking, Not elsewhere classified (R26.2)  Low Back Pain (M54.5)  OUTPATIENT[place] / [place] PART [place] B / Product Type: Medicare /   ASSESSMENT:     REHAB RECOMMENDATIONS:   Recommendation to date pending progress:  [address] [place] [address]  Equipment:    Shower Chair therapist recommended and pt agreeable     PRIOR LEVEL OF FUNCTION:  (Prior to Hospitalization)  INITIAL/CURRENT LEVEL OF FUNCTION:  (Based on today's evaluation) Bathing:   Independent  Dressing:   Independent  Feeding/Grooming:   Independent  Toileting:   Independent  Functional Mobility:   Independent Bathing:   Moderate Assistance  Dressing:   Moderate Assistance  Feeding/Grooming:   Set Up  Toileting:   Minimal Assistance  Functional Mobility:   Minimal Assistance x 2     ASSESSMENT:  Ms. Hilaria Gordon is a 68 y/o female presents with L4-L5 DLIF and lami/fusion and left hemilaminectomy. Pt lives with  in a one level home with a tub shower w/o DME. Pt is an independent ambulator but has a RW at home. Pt with decreased functional ADLs, mobility and strength. Pt educated on spinal precautions and adaptive technique for grooming, dressing and bathing ADLs maintaining spinal precautions and pt verbalized understanding. Pt min A x2 for bed mobility and sit<>stand RW. Pt then CGA RW taking side steps toward HOB. Pt set up brushing teeth EOB two cup method. Pt doing very well post op although anxious with mobility and anxious about breaking spinal precautions. Pt is currently functioning below baseline and would benefit from skilled OT services to address deficits and goals. Rec HHOT at d/c.      SUBJECTIVE:   Ms. Hilaria Gordon states, \"That wasn't as bad as I thought. \"    SOCIAL HISTORY/LIVING ENVIRONMENT: Pt lives with  in a one level home with a tub shower w/o DME. Pt is an independent ambulator but has a RW at home.        OBJECTIVE:     PAIN: VITAL SIGNS: LINES/DRAINS:   Pre Treatment: Pain Screen  Pain Scale 1: Numeric (0 - 10)  Pain Intensity 1: 2  Pain Onset 1: post op  Pain Location 1: Back  Pain Orientation 1: Lower  Pain Description 1: Sore  Pain Intervention(s) 1: Ambulation/Increased Activity;Repositioned  Post Treatment: no c/o pain   Villagomez Catheter and IV  O2 Device: Nasal cannula     GROSS EVALUATION:  BUE Within Functional Limits Abnormal/ Functional Abnormal/ Non-Functional (see comments) Not Tested Comments:   AROM [] [x] [] [] Spinal precautions PROM [] [x] [] []    Strength [] [x] [] [] Generally decreased   Balance [] [x] [] [] CGA when standing, SBA EOB   Posture [x] [] [] []    Sensation [x] [] [] []    Coordination [x] [] [] []    Tone [x] [] [] []    Edema [x] [] [] []    Activity Tolerance [] [x] [] []     [] [] [] []      COGNITION/  PERCEPTION: Intact Impaired   (see comments) Comments:   Orientation [x] []    Vision [x] []    Hearing [x] []    Judgment/ Insight [x] [] Able to name 2/3 spinal precautions after tx   Attention [x] []    Memory [x] []    Command Following [x] []    Emotional Regulation [x] []     [] []      ACTIVITIES OF DAILY LIVING: I Mod I S SBA CGA Min Mod Max Total NT Comments   BASIC ADLs:              Bathing/ Showering [] [] [] [] [] [] [] [] [] []    Toileting [] [] [] [] [] [] [] [] [] []    Dressing [] [] [] [] [] [] [] [] [] []    Feeding [] [] [] [] [] [] [] [] [] []    Grooming [] [] [x] [] [] [] [] [] [] [] Set up brushing teeth EOB two cup method   Personal Device Care [] [] [] [] [] [] [] [] [] []    Functional Mobility [] [] [] [] [x] [x] [] [] [] [] x2 for safety   I=Independent, Mod I=Modified Independent, S=Supervision, SBA=Standby Assistance, CGA=Contact Guard Assistance,   Min=Minimal Assistance, Mod=Moderate Assistance, Max=Maximal Assistance, Total=Total Assistance, NT=Not Tested    MOBILITY: I Mod I S SBA CGA Min Mod Max Total  NT x2 Comments:   Supine to sit [] [] [] [] [] [x] [] [] [] [] [x] Log roll   Sit to supine [] [] [] [] [] [x] [] [] [] [] [x] Reverse log roll   Sit to stand [] [] [] [] [] [x] [] [] [] [] [x]    Bed to chair [] [] [] [] [x] [] [] [] [] [] [x] RW side steps toward HOB   I=Independent, Mod I=Modified Independent, S=Supervision, SBA=Standby Assistance, CGA=Contact Guard Assistance,   Min=Minimal Assistance, Mod=Moderate Assistance, Max=Maximal Assistance, Total=Total Assistance, NT=Not Tested    [address] [facility] [address] [address] AM-PAC 6 Clicks   Daily Activity Inpatient Short Form        How much help from another person does the patient currently need. .. Total A Lot A Little None   1. Putting on and taking off regular lower body clothing? [] 1   [x] 2   [] 3   [] 4   2. Bathing (including washing, rinsing, drying)? [] 1   [x] 2   [] 3   [] 4   3. Toileting, which includes using toilet, bedpan or urinal?   [] 1   [] 2   [x] 3   [] 4   4. Putting on and taking off regular upper body clothing? [] 1   [] 2   [x] 3   [] 4   5. Taking care of personal grooming such as brushing teeth? [] 1   [] 2   [] 3   [x] 4   6. Eating meals? [] 1   [] 2   [] 3   [x] 4   © 2007, Trustees of [address] [place] [address] [address] [address], under license to [employer]. All rights reserved     Score:  Initial: 18 Most Recent: X (Date: -- )   Interpretation of Tool:  Represents activities that are increasingly more difficult (i.e. Bed mobility, Transfers, Gait). PLAN:   FREQUENCY/DURATION: OT Plan of Care: 3 times/week for duration of hospital stay or until stated goals are met, whichever comes first.    PROBLEM LIST:   (Skilled intervention is medically necessary to address:)  1. Decreased ADL/Functional Activities  2. Decreased Activity Tolerance  3. Decreased AROM/PROM  4. Decreased Balance  5. Decreased Gait Ability  6. Decreased Strength  7. Decreased Transfer Abilities  8. Increased Pain   INTERVENTIONS PLANNED:   (Benefits and precautions of occupational therapy have been discussed with the patient.)  1. Self Care Training  2. Therapeutic Activity  3. Therapeutic Exercise/HEP  4. Neuromuscular Re-education  5. Education     TREATMENT:     EVALUATION: Low Complexity : (Untimed Charge)    TREATMENT:   (     )  Self Care (20 Minutes): Self care including Grooming, ADL Adaptive Equipment Training and functional transfers in prep for ADLs and education of adaptive technique for ADLs maintaining spinal precautions to increase independence and decrease level of assistance required.     TREATMENT GRID:  N/A    AFTER TREATMENT POSITION/PRECAUTIONS:  Bed, Needs within reach, RN notified and Visitors at bedside    INTERDISCIPLINARY COLLABORATION:  RN/PCT, PT/PTA and OT/SAUNDERS    TOTAL TREATMENT DURATION:  OT Patient Time In/Time Out  Time In: 1525  Time Out: Ashanti 167, OT

## 2021-06-22 NOTE — PROGRESS NOTES
's pre-procedure visit and prayer with patient as requested.     Yefri Pink MDiv, BS  Board Certified

## 2021-06-22 NOTE — OP NOTES
[address] [address]   [phone]    OPERATIVE REPORT    Patient Shantelle Sosa  [id number]  1945  67 y.o. DATE OF SURGERY: 6/22/2021    SURGEON: Dr. Georgette Smith DIAGNOSIS: Lumbar stenosis and spondylolisthesis    POSTOPERATIVE DIAGNOSIS: Lumbar stenosis and spondylolisthesis    PROCEDURE:    Lateral flank incision  1. L4 - L5 direct lateral arthrodesis (CPT code 39303 primary level)  2. Insertion biomechanical device L4 through L5 (CPT A9154586 X  1 levels)  Posterior incision   3. Lumbar laminectomy  L4 through L5  with bilateral foraminotomies and a left S1 hemilaminectomy. (CPT code 82476, 48239 X 2)  4. Lumbar posterolateral fusion  L4 through L5 . (CPT code 57949 primary level)  5. Cortical screw instrumentation  L4 through L5 . (CPT 46250 non-segmental)  6. Local autograft (CPT code 17646)      ANESTHESIA: General.    ESTIMATED BLOOD LOSS: 130 ml    POSTOPERATIVE CONDITION: Stable. INTRAOPERATIVE COMPLICATIONS: None. IMPLANTS:   Implant Name Type Inv.  Item Serial No.  Lot No. LRB No. Used Action   Bio Chips Cancellous 15 cc   [id number]  N/A 1 Implanted   SERVICE FEE 10ML BIO DBM Tammie Mock CHIP - [id number]  SERVICE FEE 10ML BIO DBM PTTY W CHIP  [employer]WD [id number] N/A 1 Implanted   Proteios Growth Factor 2.5 cc   [id number] [employer]_WD [id number] N/A 1 Implanted   Sanborn Interbody System [id number] mm     [id number] N/A 1 Implanted   BLOCKER SPNL CT JASSON 45 - [id number]  BLOCKER SPNL CT JASSON 45  DAMARI SPINE HOWM_WD [id number] N/A 4 Implanted   SCREW SPNL L20MM OD55MM POLYAX HEAVEN CT JASSON - [id number]  SCREW SPNL L20MM OD55MM POLYAX HEAVEN CT [facility] SPINE HOWM_WD [id number] N/A 4 Implanted   EDUARDO SPINE PREBENT W O HEX VITALIUM 63OPH46ES JASSON - [id number]  EDUARDO SPINE PREBENT W O HEX VITALIUM [id number] [facility] SPINE HOWM_WD [id number] N/A 2 Implanted INDICATIONS FOR PROCEDURE: Back and leg pain consistent with claudication/lumbar radiculitis that is no longer responsive to conservative measures. Walking and standing tolerances have diminished. Imaging studies are concordant, showing lumbar stenosis and spondylolisthesis. In the outpatient setting, the risks, benefits, and potential complications of the above listed procedure were discussed and an informed consent was obtained. DESCRIPTION OF PROCEDURE:     Lateral flank incision:    After adequate induction of general anesthesia, the patient was placed in the right lateral decubitus position with an axillary roll and the left side up. Care was taken to pad all bony prominences. The patient was secured to the bed with several applications of tape. Preoperative antibiotics were administered. A time out was called after the patient's flank was prepped and draped in the usual sterile fashion. At this point, C-arm fluoroscopy was brought in and used to identify externally the location of the appropriate disk space. A transverse incision was created directly over disc space on the flank. The fascial plane was entered using Metzenbaum scissors and digital palpation was performed to palpate the transverse processes. The finger was then rolled up over the iliopsoas, which was palpated as well. Then, through the direct lateral incision, the monitoring probe was inserted through the fascial plane and directed to the iliopsoas surface. At this point, the monitoring and EMG equipment was applied and monitored during dissection through the iliopsoas muscle. The dilator probe was advanced through the iliopsoas and directed toward the central 1/3 of the L4 - L5  interspace and docked laterally directly over the annulus fibrosis. The probe was inserted directly into the disc space. Then, a guidewire was inserted through the cannulated probe.   This was confirmed fluoroscopically in AP and lateral planes and was felt to be satisfactory. At this point, the dissection was sequentially dilated and finally the minimally invasive retractor was inserted over the final dilator. Once the retractor was secured, the dilators were removed and the retractor was secured with the locking pin into the vertebral body. The guidepin was then removed. Light sources were applied and the area was checked with the monitoring probe. The annulotomy knife was used to perform an annulotomy of the  L4 - L5 disk space. A complete diskectomy was performed using a series of pituitary rongeurs, rasps and curets. A Benitez elevator was inserted and impacted across the contralateral annulus fibrosis. This was done under fluoroscopic visualization. Once all disk material was removed and the implants had been prepared, the sizing paddles were inserted and increased in size until there was a good fit. The trial implant was inserted and visualized fluoroscopically in both AP and lateral planes and was felt to be satisfactory. The interbody cage device was then selected. Allograft demineralized bone matrix was prepared on the back table and inserted into the cage. The interbody cage was then impacted under fluoroscopic visualization to be in the central portion of the disk space and inserted across both endplates on the coronal view. This was felt to be satisfactory with radiographic imaging. With this, the minimally invasive retractor was removed. Final fluoroscopic images were obtained in both planes and felt to be satisfactory. The superficial wound was liberally irrigated and the wound was approximated with a 2-0 and a 3-0 Vicryl suture in a layered fashion. Benzoin and Steri-Strips were applied. Posterior incision:    The patient was then reopositioned prone on the Nivia Basque spinal table. Care was taken to pad all bony prominences. The shoulders and elbows were placed in the 90/90 position.  The abdomen was allowed to hang free to decrease intraabdominal and venous pressure. The lumbar area was prepped and draped in the usual sterile fashion. A second time out was called to confirm the appropriate patient, proposed procedure and proposed incision sites. With this conformation, an incision was created midline, over the lumbar spinous processes. Dissection was carried down to the lumbodorsal fascia. The lumbodorsal fascia and paraspinous musculature were elevated in a subperiosteal fashion, laterally off of the spinous processes and lamina. A curet was slipped beneath the lamina and a cross table fluoroscopic image was obtained to identify the appropriate spinal level. The pars interarticularis was exposed at each level. Care was taken to preserve the facet capsule at each level. The deep retractors were placed to facilitate visualization. A Leksell rongeur was used to resect the spinous processes of  L4 - L5. The 4 mm elizabeth was used to thin the lamina to an eggshell like thickness. A triple-0 angled curet was used to elevated the ligamentum flavum off of its origin on the caudal surface of the L4 lamina as well as off the cephalad surface of the adjacent lamina. The ligamentum was elevated from the thecal sac and a plane was created in the epidural space with the [facility]. A 4 mm Kerrison was used to perform a laminectomy of  L4 - L5 . The central laminectomy was widened to the medial border of the pedicle at each level. With the central laminectomy completed, a 4 mm Kerrison was used to under cut the lateral recess along the entire length of the laminectomy site. Then using the [facility] elevator to retract the thecal sac and identify each of the nerve roots, partial foraminotomies were performed and each nerve was visualized and decompressed bilaterally. The laminectomy was extended caudally on the left through S1 to complete a left S1 hemilaminectomy and foraminotomy.        The 4 mm elizabeth was used to decorticate the previously exposed transverse processes and lateral aspect of the facet joints and pars intra-articularis. The [employer] spinal instrumentation system was brought to the field and using a free hand intersection technique, cortical screws were placed bilaterally from L4 to L5. The medial border of the pedicle was visualized through the spinal canal to confirm no medial or inferior breech. This was felt to be satisfactory. At this point the Protios soaked allograft was then packed into the lateral gutters beneath the screw heads, along the decorticated transverse processes and lateral facet joints for the arthrodesis. Appropriately sized rods were then selected and bent into additional lordosis and laid into the pedicle screw heads from  L4 to L5. The set screws were then applied and tightened to the appropriate torque. C-arm fluoroscopy was brought in and used to obtain images to confirm appropriate hardware level and placement. This was felt to be satisfactory. With this, the wound was liberally irrigated and a hemovac drain was inserted through a separate incision in a subfascial plane. The lumbodorsal fascia was approximated with a # 1 Vicryl suture in an interrupted fashion. The subcutaneous tissue and skin were approximated in a layered fashion. Dermabond was applied. Sterile dressings were applied. The patient tolerated the procedure well and was returned to the postanesthesia care unit in stable condition. At the end of the case, all sponge, needle, and instrument counts were correct.      Darryl Christianson MD

## 2021-06-22 NOTE — PERIOP NOTES
TRANSFER - OUT REPORT:    Verbal report given to Berta on Phi Foot  being transferred to  [phone] for routine post - op       Report consisted of patients Situation, Background, Assessment and   Recommendations(SBAR). Information from the following report(s) SBAR, Kardex, OR Summary, Procedure Summary, Intake/Output and Cardiac Rhythm sinus was reviewed with the receiving nurse. Lines:   Peripheral IV 06/22/21 Left;Posterior Wrist (Active)   Site Assessment Clean, dry, & intact 06/22/21 1119   Phlebitis Assessment 0 06/22/21 1119   Infiltration Assessment 0 06/22/21 1119   Dressing Status Clean, dry, & intact 06/22/21 1119   Dressing Type Transparent;Tape 06/22/21 1119   Hub Color/Line Status Patent 06/22/21 1119   Action Taken Blood drawn 06/22/21 0603       Peripheral IV 06/22/21 Left Hand (Active)   Site Assessment Clean, dry, & intact 06/22/21 1119   Phlebitis Assessment 0 06/22/21 1119   Infiltration Assessment 0 06/22/21 1119   Dressing Status Clean, dry, & intact 06/22/21 1119   Dressing Type Tape;Transparent 06/22/21 1119   Hub Color/Line Status Patent 06/22/21 1119        Opportunity for questions and clarification was provided. Patient transported with:   O2 @ 2 liters    VTE prophylaxis orders have been written for Phi Camacho. Patient and family given floor number and nurses name. Family updated re: pt status after security code verified.

## 2021-06-22 NOTE — PROGRESS NOTES
ACUTE PHYSICAL THERAPY GOALS:  (Developed with and agreed upon by patient and/or caregiver.)    (1.) Isa Eagle will move from supine to sit and sit to supine  and roll side to side with MODIFIED INDEPENDENCE within 7 treatment day(s). (2.) Isa Eagle will transfer from bed to chair and chair to bed with MODIFIED INDEPENDENCE using the least restrictive device within 7 treatment day(s). (3.) Isa Eagle will ambulate with MODIFIED INDEPENDENCE for 400 feet with the least restrictive device within 7 treatment day(s). (4.) Isa Eagle will perform standing static and dynamic balance activities x 20 minutes with MODIFIED INDEPENDENCE to improve safety within 7 treatment day(s). (5.) Isa Eagle will perform bilateral lower extremity exercises x 15 min for HEP with MODIFIED INDEPENDENCE to improve strength, endurance, and functional mobility within 7 treatment day(s).      PHYSICAL THERAPY ASSESSMENT: Initial Assessment PT Treatment Day # 1      Isa Eagle is a 67 y.o. female   PRIMARY DIAGNOSIS: Spondylolisthesis of lumbar region  Neurogenic claudication [M48.062]  Spondylolisthesis of lumbar region [M43.16]  Lumbar radiculopathy [M54.16]  Status post lumbar spinal arthrodesis [Z98.1]  Procedure(s) (LRB):  L4-L5 DIRECT LATERAL FUSION W/ INTERBODY SPACER AND ALLOGRAFT(DLIF) (Left)  LEFT S1 HEMILAMINECTOMY AND L4-L5 LAMINECTOMY AND FUSION W/ ALLOGRAFT AND INSTRUMENTATION (Left)  Day of Surgery  Reason for Referral:  Post-op lumbar DLIF  ICD-10: Treatment Diagnosis: Generalized Muscle Weakness (M62.81)  Difficulty in walking, Not elsewhere classified (R26.2)  Other abnormalities of gait and mobility (R26.89)  Low Back Pain (M54.5)  OUTPATIENT: [place] / [place] B / Product Type: Medicare /     ASSESSMENT:     REHAB RECOMMENDATIONS:   Recommendation to date pending progress:  [address] [place] [address]  Equipment:    None PRIOR LEVEL OF FUNCTION:  (Prior to Hospitalization) INITIAL/CURRENT LEVEL OF FUNCTION:  (Most Recently Demonstrated)   Bed Mobility:   Independent  Sit to Stand:   Independent  Transfers:   Independent  Gait/Mobility:   Independent Bed Mobility:   Minimal Assistance x 2  Sit to Stand:   Minimal Assistance x 2  Transfers:   Minimal Assistance x 2  Gait/Mobility:   Contact Guard Assistance (side steps EOB)     ASSESSMENT:  Ms. Merari Hercules is a 67year old F who presents s/p L4-L5 DLIF, L hemilaminectomy, and L4-L5 laminectomy and fusion. This date pt performs mobility including bed mobility, multiple sit to stand trials, and side stepping with min A x 2. Overall, pt is doing very well given day of surgery. Pt presents as functioning below her baseline, with deficits in mobility including transfers, gait, balance, and activity tolerance. Pt will benefit from skilled therapy services to address stated deficits to promote return to highest level of function, independence, and safety. Will continue to follow. SUBJECTIVE:   Ms. Merari Hercules states, \"I really feel fine and am not having much pain. \"    SOCIAL HISTORY/LIVING ENVIRONMENT: Lives w/  in [facility] w/ no steps.       OBJECTIVE:     PAIN: VITAL SIGNS: LINES/DRAINS:   Pre Treatment: Pain Screen  Pain Scale 1: Numeric (0 - 10)  Pain Intensity 1: 2  Pain Onset 1: post-op  Pain Location 1: Back  Post Treatment: 2/10 Vital Signs  O2 Sat (%): 96 %  O2 Device: None (Room air) Villagomez Catheter, Hemovac and IV  O2 Device: None (Room air)     GROSS EVALUATION:  BLE Within Functional Limits Abnormal/ Functional Abnormal/ Non-Functional (see comments) Not Tested Comments:   AROM [x] [] [] []    PROM [x] [] [] []    Strength [] [x] [] [] 4-/5 grossly BLE   Balance [] [x] [] [] Slight unsteadiness on feet   Posture [x] [] [] []    Sensation [x] [] [] []    Coordination [x] [] [] []    Tone [] [] [] [x]    Edema [] [] [] [x]    Activity Tolerance [] [x] [] [] Slightly limited by post-op pain    [] [] [] []      COGNITION/  PERCEPTION: Intact Impaired   (see comments) Comments:   Orientation [x] []    Vision [x] []    Hearing [x] []    Command Following [x] []    Safety Awareness [x] [] Receptive to precautions; needs verbal cues to remind    [] []      MOBILITY: I Mod I S SBA CGA Min Mod Max Total  NT x2 Comments:   Bed Mobility    Rolling [] [] [] [] [] [x] [] [] [] [] [x]    Supine to Sit [] [] [] [] [] [x] [] [] [] [] [x]    Scooting [] [] [] [] [] [x] [] [] [] [] [x]    Sit to Supine [] [] [] [] [] [x] [] [] [] [] [x]    Transfers    Sit to Stand [] [] [] [] [] [x] [] [] [] [] [x]    Bed to Chair [] [] [] [] [] [] [] [] [] [x] []    Stand to Sit [] [] [] [] [] [x] [] [] [] [] [x]    I=Independent, Mod I=Modified Independent, S=Supervision, SBA=Standby Assistance, CGA=Contact Guard Assistance,   Min=Minimal Assistance, Mod=Moderate Assistance, Max=Maximal Assistance, Total=Total Assistance, NT=Not Tested  GAIT: I Mod I S SBA CGA Min Mod Max Total  NT x2 Comments:   Level of Assistance [] [] [] [] [x] [] [] [] [] [] []    Distance 2x5ft side step EOB    DME Rolling Walker    Gait Quality Slow, guarded    Weightbearing Status N/A     I=Independent, Mod I=Modified Independent, S=Supervision, SBA=Standby Assistance, CGA=Contact Guard Assistance,   Min=Minimal Assistance, Mod=Moderate Assistance, Max=Maximal Assistance, Total=Total Assistance, NT=Not Tested    [facility] Form       How much difficulty does the patient currently have. .. Unable A Lot A Little None   1. Turning over in bed (including adjusting bedclothes, sheets and blankets)? [] 1   [] 2   [x] 3   [] 4   2. Sitting down on and standing up from a chair with arms ( e.g., wheelchair, bedside commode, etc.)   [] 1   [] 2   [x] 3   [] 4   3. Moving from lying on back to sitting on the side of the bed?    [] 1   [] 2   [x] 3   [] 4   How much help from another person does the patient currently need. .. Total A Lot A Little None   4. Moving to and from a bed to a chair (including a wheelchair)? [] 1   [] 2   [x] 3   [] 4   5. Need to walk in hospital room? [] 1   [] 2   [x] 3   [] 4   6. Climbing 3-5 steps with a railing? [] 1   [x] 2   [] 3   [] 4   © 2007, Trustees of [facility] [employer], under license to [employer]. All rights reserved     Score:  Initial: 17 Most Recent: X (Date: -- )    Interpretation of Tool:  Represents activities that are increasingly more difficult (i.e. Bed mobility, Transfers, Gait). PLAN:   FREQUENCY/DURATION: PT Plan of Care: BID for duration of hospital stay or until stated goals are met, whichever comes first.    PROBLEM LIST:   (Skilled intervention is medically necessary to address:)  1. Decreased ADL/Functional Activities  2. Decreased Activity Tolerance  3. Decreased Balance  4. Decreased Gait Ability  5. Decreased Strength  6. Decreased Transfer Abilities   INTERVENTIONS PLANNED:   (Benefits and precautions of physical therapy have been discussed with the patient.)  1. Therapeutic Activity  2. Therapeutic Exercise/HEP  3. Neuromuscular Re-education  4. Gait Training  5. Education     TREATMENT:     EVALUATION: Low Complexity : (Untimed Charge)    TREATMENT:   (     )  Therapeutic Activity (30 Minutes): Therapeutic activity included Rolling, Supine to Sit, Sit to Supine, Scooting, Transfer Training, Ambulation on level ground, Sitting balance  and Standing balance to improve functional Mobility, Strength and Activity tolerance.     TREATMENT GRID:  N/A    AFTER TREATMENT POSITION/PRECAUTIONS:  Bed, Needs within reach, RN notified and Visitors at bedside    INTERDISCIPLINARY COLLABORATION:  RN/PCT, PT/PTA, OT/SAUNDERS and SPT    TOTAL TREATMENT DURATION:  PT Patient Time In/Time Out  Time In: 1515  Time Out: [address], PT

## 2021-06-23 PROCEDURE — 74011000250 HC RX REV CODE- 250: Performed by: ORTHOPAEDIC SURGERY

## 2021-06-23 PROCEDURE — 97530 THERAPEUTIC ACTIVITIES: CPT

## 2021-06-23 PROCEDURE — 74011250636 HC RX REV CODE- 250/636: Performed by: ORTHOPAEDIC SURGERY

## 2021-06-23 PROCEDURE — 74011250637 HC RX REV CODE- 250/637: Performed by: ORTHOPAEDIC SURGERY

## 2021-06-23 RX ORDER — OXYCODONE HYDROCHLORIDE 5 MG/1
5 TABLET ORAL
Qty: 28 TABLET | Refills: 0 | Status: SHIPPED | OUTPATIENT
Start: 2021-06-23 | End: 2021-07-01 | Stop reason: SDUPTHER

## 2021-06-23 RX ADMIN — CEFAZOLIN SODIUM 2 G: 100 INJECTION, POWDER, LYOPHILIZED, FOR SOLUTION INTRAVENOUS at 00:27

## 2021-06-23 RX ADMIN — DOCUSATE SODIUM 100 MG: 100 CAPSULE, LIQUID FILLED ORAL at 09:33

## 2021-06-23 RX ADMIN — Medication 1 AMPULE: at 09:32

## 2021-06-23 RX ADMIN — GABAPENTIN 100 MG: 100 CAPSULE ORAL at 09:33

## 2021-06-23 RX ADMIN — FAMOTIDINE 20 MG: 20 TABLET, FILM COATED ORAL at 09:34

## 2021-06-23 RX ADMIN — ACETAMINOPHEN 650 MG: 325 TABLET ORAL at 11:17

## 2021-06-23 RX ADMIN — ACETAMINOPHEN 650 MG: 325 TABLET ORAL at 05:40

## 2021-06-23 RX ADMIN — Medication 10 ML: at 05:40

## 2021-06-23 RX ADMIN — OXYCODONE HYDROCHLORIDE 5 MG: 5 TABLET ORAL at 04:20

## 2021-06-23 RX ADMIN — ACETAMINOPHEN 650 MG: 325 TABLET ORAL at 00:26

## 2021-06-23 RX ADMIN — OXYCODONE HYDROCHLORIDE 5 MG: 5 TABLET ORAL at 11:17

## 2021-06-23 RX ADMIN — CEFAZOLIN SODIUM 2 G: 100 INJECTION, POWDER, LYOPHILIZED, FOR SOLUTION INTRAVENOUS at 10:19

## 2021-06-23 NOTE — PROGRESS NOTES
Problem: Falls - Risk of  Goal: *Absence of Falls  Description: Document Ashok Espinoza Fall Risk and appropriate interventions in the flowsheet.   Outcome: Progressing Towards Goal  Note: Fall Risk Interventions:  Mobility Interventions: Bed/chair exit alarm, Patient to call before getting OOB         Medication Interventions: Bed/chair exit alarm, Patient to call before getting OOB, Teach patient to arise slowly                   Problem: Patient Education: Go to Patient Education Activity  Goal: Patient/Family Education  Outcome: Progressing Towards Goal     Problem: Patient Education: Go to Patient Education Activity  Goal: Patient/Family Education  Outcome: Progressing Towards Goal     Problem: Patient Education: Go to Patient Education Activity  Goal: Patient/Family Education  Outcome: Progressing Towards Goal

## 2021-06-23 NOTE — PROGRESS NOTES
Perfect Serve message to Kasia GARCIA to let her know the patient accidentally pulled out her Hemovac.

## 2021-06-23 NOTE — PROGRESS NOTES
Patient discharged home in stable condition with family. AVS reviewed with patient and spouse. Verbalized understanding with all instructions.    Visit Vitals  BP (!) 142/70 (BP 1 Location: Left upper arm, BP Patient Position: Sitting)   Pulse (!) 54   Temp 97.4 °F (36.3 °C)   Resp 20   Ht 5' 0.5\" (1.537 m)   Wt 82.7 kg (182 lb 6.4 oz)   SpO2 94%   BMI 35.04 kg/m²

## 2021-06-23 NOTE — PROGRESS NOTES
ORTHO PROGRESS NOTE    2021    Admit Date: 2021  Admit Diagnosis: Neurogenic claudication [M48.062]; Spondylolisthesis of lumbar region [M43.16]; Lumbar radiculopathy [M54.16]; Status post lumbar spinal arthrodesis [Z98.1]  Post Op day: 1 Day Post-Op      Subjective:     Eduard An is a patient who is now 1 Day Post-Op  and has no complaints. Drain came out. Walking independently. Objective:       Vital Signs:    Patient Vitals for the past 8 hrs:   BP Temp Pulse Resp SpO2   21 0420 131/72 97.6 °F (36.4 °C) (!) 58 17 95 %     Temp (24hrs), Av.6 °F (36.4 °C), Min:97.3 °F (36.3 °C), Max:98 °F (36.7 °C)      LAB:    No results for input(s): HGB, WBC, PLT, HGBEXT, PLTEXT in the last 72 hours. I/O:  No intake/output data recorded.  1901 -  0700  In: 1100 [I.V.:1100]  Out: 3260 [Urine:2890; Drains:240]    Physical Exam:    Awake and in no acute distress. Mood and affect appropriate. Respirations unlabored and no evidence cyanosis. Calves nontender. Abdomen soft and nontender. Dressing clean/dry  No new neurologic deficit.     Assessment:      Patient Active Problem List   Diagnosis Code    Spondylolisthesis of lumbar region M43.16    Neurogenic claudication M48.062    Status post lumbar spinal arthrodesis Z98.1       1 Day Post-Op STATUS POST Procedure(s):  L4-L5 DIRECT LATERAL FUSION W/ INTERBODY SPACER AND ALLOGRAFT(DLIF)  LEFT S1 HEMILAMINECTOMY AND L4-L5 LAMINECTOMY AND FUSION W/ ALLOGRAFT AND INSTRUMENTATION      Plan:     Discontinue: IV  Consult: none  Anticipate discharge to: HOME      Signed By: Rj Velazquez MD

## 2021-06-23 NOTE — DISCHARGE INSTRUCTIONS
Patient Education        Lumbar Laminectomy: What to Expect at Home  Your Recovery  A lumbar laminectomy is surgery to ease pressure on the spinal cord and nerves of the lower spine. The doctor took out pieces of bone that were squeezing the spinal cord and nerves. You can expect your back to feel stiff or sore after surgery. This should improve in the weeks after surgery. You may have trouble sitting or standing in one position for very long and may need pain medicine in the weeks after your surgery. Your doctor may advise you to work with a physical therapist to strengthen the muscles around your spine and trunk. You will need to learn how to lift, twist, and bend so that you don't put too much strain on your back. This care sheet gives you a general idea about how long it will take for you to recover. But each person recovers at a different pace. Follow the steps below to get better as quickly as possible. How can you care for yourself at home? Activity    · Rest when you feel tired. Getting enough sleep will help you recover.     · Try to walk each day. Start by walking a little more than you did the day before. Bit by bit, increase the amount you walk. Walking boosts blood flow and helps prevent pneumonia and constipation. Walking may also decrease your muscle soreness after surgery.     · If advised by your doctor, you may need to avoid lifting anything that would cause excessive strain on your back. This may include a child, heavy grocery bags and milk containers, a heavy briefcase or backpack, cat litter or dog food bags, or a vacuum .     · Avoid strenuous activities, such as bicycle riding, jogging, weight lifting, or aerobic exercise, until your doctor says it is okay.     · Do not drive for 2 to 4 weeks after your surgery or until your doctor says it is okay.     · Avoid riding in a car for more than 30 minutes at a time for 2 to 4 weeks after surgery.  If you must ride in a car for a longer distance, stop often to walk and stretch your legs.     · Try to change your position about every 30 minutes while sitting or standing. This will help decrease your back pain while you are healing.     · You will probably need to take 4 to 6 weeks off from work. It depends on the type of work you do and how you feel.     · You may have sex as soon as you feel able, but avoid positions that put stress on your back or cause pain. Diet    · You can eat your normal diet. If your stomach is upset, try bland, low-fat foods like plain rice, broiled chicken, toast, and yogurt.     · Drink plenty of fluids (unless your doctor tells you not to).     · You may notice that your bowel movements are not regular right after your surgery. This is common. Try to avoid constipation and straining with bowel movements. You may want to take a fiber supplement every day. If you have not had a bowel movement after a couple of days, ask your doctor about taking a mild laxative. Medicines    · Your doctor will tell you if and when you can restart your medicines. He or she will also give you instructions about taking any new medicines.     · If you take aspirin or some other blood thinner, ask your doctor if and when to start taking it again. Make sure that you understand exactly what your doctor wants you to do.     · Take pain medicines exactly as directed. ? If the doctor gave you a prescription medicine for pain, take it as prescribed. ? If you are not taking a prescription pain medicine, ask your doctor if you can take an over-the-counter medicine.     · If your doctor prescribed antibiotics, take them as directed. Do not stop taking them just because you feel better. You need to take the full course of antibiotics.     · If you think your pain medicine is making you sick to your stomach:  ? Take your medicine after meals (unless your doctor has told you not to). ? Ask your doctor for a different pain medicine.    Incision care    · If you have strips of tape on the cut (incision) the doctor made, leave the tape on for a week or until it falls off.     · Wash the area daily with warm, soapy water and pat it dry.     · Keep the area clean and dry. You may cover it with a gauze bandage if it weeps or rubs against clothing. Change the bandage every day. Exercise    · Do back exercises as instructed by your doctor.     · Your doctor may advise you to work with a physical therapist to improve the strength and flexibility of your back. Other instructions    · To reduce stiffness and help sore muscles, use a warm water bottle, a heating pad set on low, or a warm cloth on your back. Do not put heat right over the incision. Do not go to sleep with a heating pad on your skin. Follow-up care is a key part of your treatment and safety. Be sure to make and go to all appointments, and call your doctor if you are having problems. It's also a good idea to know your test results and keep a list of the medicines you take. When should you call for help? Call 911 anytime you think you may need emergency care. For example, call if:    · You passed out (lost consciousness).     · You have sudden chest pain and shortness of breath, or you cough up blood.     · You are unable to move a leg at all. Call your doctor now or seek immediate medical care if:    · You have new or worse symptoms in your legs or buttocks. Symptoms may include:  ? Numbness or tingling. ? Weakness. ? Pain.     · You lose bladder or bowel control.     · You have loose stitches, or your incision comes open.     · You have blood or fluid draining from the incision.     · You have signs of infection, such as:  ? Increased pain, swelling, warmth, or redness. ? Pus draining from the incision. ? A fever. ? Red streaks leading from the incision.    Watch closely for changes in your health, and be sure to contact your doctor if:    · You do not have a bowel movement after taking a laxative.     · You are not getting better as expected. Where can you learn more? Go to http://www.gray.com/  Enter N051 in the search box to learn more about \"Lumbar Laminectomy: What to Expect at Home. \"  Current as of: November 16, 2020               Content Version: 12.8  © 0349-3570 [employer], Inc[employer]. Care instructions adapted under license by [employer] (which disclaims liability or warranty for this information). If you have questions about a medical condition or this instruction, always ask your healthcare professional. [address] any warranty or liability for your use of this information.

## 2021-06-23 NOTE — PROGRESS NOTES
The documentation for this period is being entered following the guidelines as defined in the [address] downtime policy by Francisco Ott RN.     System down from 0100 to 0335

## 2021-06-23 NOTE — PROGRESS NOTES
CM reviewed chart for potential discharge needs as discharge order noted. No CM consult received. CM in to see patient at bedside who is alert and laying in bed. Spouse and other family at bedside. Demographics, PCP and insurance verified. Therapy recommendations for home health PT/OT and shower chair/BSC reviewed. Patient and spouse are agreeable. List of Medicare certified home health agencies reviewed and patient and spouse would referral sent sent to [facility]. Order and corresponding information faxed and admissions liaison notified. 3 in 1 BSC/shower chair provided to patient and signed consent/acknowledgement faxed to [facility] .  Patient will transport home with spouse via personal vehicle. Care Management Interventions  PCP Verified by CM: Yes Adrian Keating MD)  Mode of Transport at Discharge: Other (see comment) (Spouse)  Transition of Care Consult (CM Consult): Home Health, Discharge Carmelo Hazel  [address] [address]: No  Reason Outside [place]:  (Patient preference from [facility] list)  Discharge Durable Medical Equipment: Yes (3 in 1 [facility])  Physical Therapy Consult: Yes  Occupational Therapy Consult: Yes  Speech Therapy Consult: No  Current Support Network: Own Home, Lives with Spouse  Confirm Follow Up Transport: Family  The Plan for Transition of Care is Related to the Following Treatment Goals : Patient will participate in home health therapies in order to return to baseline independence in ADLs.    The Patient and/or Patient Representative was Provided with a Choice of Provider and Agrees with the Discharge Plan?: Yes  Freedom of Choice List was Provided with Basic Dialogue that Supports the Patient's Individualized Plan of Care/Goals, Treatment Preferences and Shares the Quality Data Associated with the Providers?: Yes  Discharge Location  Discharge Placement: Home with home health

## 2021-06-23 NOTE — PROGRESS NOTES
Assumed care of the patient. Off going report given by [place]. The patient is AO x 4 at this time and is resting in bed. IV access: PIV is saline locked. Oxygen needs: Stable on room air. Pain assessment: NAD at this time. Dry dressing intact to the mid back with hemovac intact. Safety: Bed is low and call light is within reach. Patient understands to call for assistance. Abrahan Viveros

## 2021-06-23 NOTE — PROGRESS NOTES
ACUTE PHYSICAL THERAPY GOALS:  (Developed with and agreed upon by patient and/or caregiver.)  (1.) Pearl Dumont will move from supine to sit and sit to supine  and roll side to side with MODIFIED INDEPENDENCE within 7 treatment day(s). (2.) Pearl Dumont will transfer from bed to chair and chair to bed with MODIFIED INDEPENDENCE using the least restrictive device within 7 treatment day(s). (3.) Pearl Dumont will ambulate with MODIFIED INDEPENDENCE for 400 feet with the least restrictive device within 7 treatment day(s). (4.) Pearl Dumont will perform standing static and dynamic balance activities x 20 minutes with MODIFIED INDEPENDENCE to improve safety within 7 treatment day(s). (5.) Pearl Dumont will perform bilateral lower extremity exercises x 15 min for HEP with MODIFIED INDEPENDENCE to improve strength, endurance, and functional mobility within 7 treatment day(s). PHYSICAL THERAPY: Daily Note and AM Treatment Day # 2    Pearl Dumont is a 67 y.o. female   PRIMARY DIAGNOSIS: Spondylolisthesis of lumbar region  Neurogenic claudication [M48.062]  Spondylolisthesis of lumbar region [M43.16]  Lumbar radiculopathy [M54.16]  Status post lumbar spinal arthrodesis [Z98.1]  Procedure(s) (LRB):  L4-L5 DIRECT LATERAL FUSION W/ INTERBODY SPACER AND ALLOGRAFT(DLIF) (Left)  LEFT S1 HEMILAMINECTOMY AND L4-L5 LAMINECTOMY AND FUSION W/ ALLOGRAFT AND INSTRUMENTATION (Left)  1 Day Post-Op    ASSESSMENT:     REHAB RECOMMENDATIONS: CURRENT LEVEL OF FUNCTION:  (Most Recently Demonstrated)   Recommendation to date pending progress:  Setting:   No further skilled therapy   Equipment:    None Bed Mobility:   Supervision  Sit to Stand:   Supervision  Transfers:   Supervision  Gait/Mobility:   Supervision     ASSESSMENT:  Ms. Hua Wang is making great progress towards PT goals as noted by increased gait distance.  Patient able to recall 3/3 spinal precautions and demonstrated proper log rolling technique. Reviewed all instructions regarding PT in preparation for d/c home later today. Patient verbalized understanding. Will continue efforts. SUBJECTIVE:   Ms. Karen Tom states, \"I'm ready to get back to it. \"    SOCIAL HISTORY/ LIVING ENVIRONMENT: See initial evaluation     OBJECTIVE:     PAIN: VITAL SIGNS: LINES/DRAINS:   Pre Treatment: Pain Screen  Pain Scale 1: FLACC  Pain Intensity 1: 0  Post Treatment: 0   None  O2 Device: None (Room air)     MOBILITY: I Mod I S SBA CGA Min Mod Max Total  NT x2 Comments:   Bed Mobility    Rolling [] [] [x] [] [] [] [] [] [] [] []    Supine to Sit [] [] [x] [] [] [] [] [] [] [] []    Scooting [] [] [] [] [] [] [] [] [] [] []    Sit to Supine [] [] [x] [] [] [] [] [] [] [] []    Transfers    Sit to Stand [] [] [x] [] [] [] [] [] [] [] []    Bed to Chair [] [] [x] [] [] [] [] [] [] [] []    Stand to Sit [] [] [x] [] [] [] [] [] [] [] []    I=Independent, Mod I=Modified Independent, S=Supervision, SBA=Standby Assistance, CGA=Contact Guard Assistance,   Min=Minimal Assistance, Mod=Moderate Assistance, Max=Maximal Assistance, Total=Total Assistance, NT=Not Tested    BALANCE: Good Fair+ Fair Fair- Poor NT Comments   Sitting Static [x] [] [] [] [] []    Sitting Dynamic [x] [] [] [] [] []              Standing Static [x] [] [] [] [] []    Standing Dynamic [x] [x] [] [] [] []      GAIT: I Mod I S SBA CGA Min Mod Max Total  NT x2 Comments:   Level of Assistance [] [] [x] [x] [] [] [] [] [] [] []    Distance 450'    DME None    Gait Quality     Weightbearing  Status N/A     I=Independent, Mod I=Modified Independent, S=Supervision, SBA=Standby Assistance, CGA=Contact Guard Assistance,   Min=Minimal Assistance, Mod=Moderate Assistance, Max=Maximal Assistance, Total=Total Assistance, NT=Not Tested    PLAN:   FREQUENCY/DURATION: PT Plan of Care: BID for duration of hospital stay or until stated goals are met, whichever comes first.  TREATMENT:     TREATMENT:   ( )  Therapeutic Activity (25 Minutes): Therapeutic activity included Rolling, Supine to Sit, Sit to Supine, Scooting, Transfer Training, Ambulation on level ground, Standing balance and review of spinal precautions and d/c instructions regarding PT to improve functional Mobility, Strength and Activity tolerance.     TREATMENT GRID:  N/A    AFTER TREATMENT POSITION/PRECAUTIONS:  Chair, Needs within reach, RN notified and Visitors at bedside    INTERDISCIPLINARY COLLABORATION:  RN/PCT and PT/PTA    TOTAL TREATMENT DURATION:  PT Patient Time In/Time Out  Time In: 0839  Time Out: [address] [facility]

## 2021-06-29 VITALS
HEIGHT: 61 IN | BODY MASS INDEX: 34.44 KG/M2 | TEMPERATURE: 97.4 F | SYSTOLIC BLOOD PRESSURE: 142 MMHG | OXYGEN SATURATION: 94 % | HEART RATE: 54 BPM | DIASTOLIC BLOOD PRESSURE: 70 MMHG | WEIGHT: 182.4 LBS | RESPIRATION RATE: 20 BRPM

## 2021-07-01 DIAGNOSIS — Z98.1 STATUS POST LUMBAR SPINAL ARTHRODESIS: ICD-10-CM

## 2021-07-01 RX ORDER — OXYCODONE HYDROCHLORIDE 5 MG/1
5 TABLET ORAL
Qty: 28 TABLET | Refills: 0 | Status: SHIPPED | OUTPATIENT
Start: 2021-07-01 | End: 2021-07-08

## 2021-07-23 ENCOUNTER — HOSPITAL ENCOUNTER (OUTPATIENT)
Dept: PHYSICAL THERAPY | Age: 76
Discharge: HOME | End: 2021-07-23
Payer: MEDICARE

## 2021-07-23 PROCEDURE — 97161 PT EVAL LOW COMPLEX 20 MIN: CPT

## 2021-07-23 PROCEDURE — 97110 THERAPEUTIC EXERCISES: CPT

## 2021-07-23 NOTE — THERAPY EVALUATION
Wallace Brown  : 1945  [place] / Product Type: Medicare /  [address] at [facility] [place]  [address] [facility], [address] [place] [address],[address], [facility] UConor Alfonso.  [phone]   [phone]       OUTPATIENT PHYSICAL THERAPY:Initial Assessment 2021     ICD-10: Treatment Diagnosis: M62.81 generalized weakness  Precautions/Allergies:   Latex, natural rubber; Diphenhydramine hcl; Penicillins; Hydromorphone (bulk); and Povidone-iodine   TREATMENT PLAN:  Effective Dates: 2021 TO 2021 (60 days). Frequency/Duration: 2 times a week for 60 Day(s) MEDICAL/REFERRING DIAGNOSIS:  back pain   DATE OF ONSET: SUrgery 2021  REFERRING PHYSICIAN: Suman Ruano MD Orders: eval and treat  Return MD Appointment: 2021     INITIAL ASSESSMENT:  Wallace Brown is s/p L4/5 fusion and presents with decreased postural awareness, Decreased spinal ROM, weakness in hips and core and poor postural awareness are limiting activity level. She will benefit from PT for guided rehab to promote normalized return of motion, strength, and function in order for safe return to home chores, standing and walking. PROBLEM LIST (Impacting functional limitations):  1. Pain in back with some functional activities  2. Poor core endurance  3. Decreased functional strength JL hip/LE   4. Decreased gait endurance and balance skills INTERVENTIONS PLANNED:  1. Modalities PRN, including ultrasound, estim, and iontophoresis  2. Soft tissue and joint mobilization for ROM and flexibility  3. Stretching, progressive resistive exercises and HEP for return to functional activities. 4. Back Education and Training for body mechanics with activities of daily living  5. If needed, aquatic therapy. GOALS: (Goals have been discussed and agreed upon with patient.)   Short-Term Functional Goals: Time Frame: 4 weeks  1.  Report no more than minimal, intermittent 3/10 pain with standing and walking 15 min. 2. Demonstrates good posture with standing, working at computer, driving and sleeping. 3. JL hip ABD and IR strength is 5/5 for improved stability with static and dynamic balance, walking, and progressing into strengthening phase. 4. Independent with initial level HEP. Discharge Goals: Time Frame: 8 weeks  1. No significant pain in back with all normalized daily home and work activities, and less than 3/50 on Oswestry. 2. Able to walk, stand at least 30 minutes with min to no pain in back. 3. Able to balance with good control in single-leg stand greater than 15 seconds with eyes open, greater than 3 seconds with eyes closed for improved dynamic stability. 4. Demonstrates good use of core strategies with functional activities (squat, push-pull, lift). 5. Independent with HEP for advanced hip strengthening. OUTCOME MEASURE:   Tool Used: Modified Oswestry Low Back Pain Questionnaire  Score:  Initial: 6/50  Most Recent: X/50 (Date: -- )   Interpretation of Score: Each section is scored on a 0-5 scale, 5 representing the greatest disability. The scores of each section are added together for a total score of 50. MEDICAL NECESSITY:   · Patient is expected to demonstrate progress in strength, functional technique and endurance to increase independence with functional activities, walking without stressing inappropriately stressing back. .  REASON FOR SERVICES/OTHER COMMENTS:  · Patient continues to require skilled intervention due to weakness in core/hips, poor postural awareness and decreased functional abilities, need for guided rehab. .  Total Duration:  PT Patient Time In/Time Out  Time In: 1000  Time Out: 1100    Rehabilitation Potential For Stated Goals: Excellent  Regarding Patience Galeas's therapy, I certify that the treatment plan above will be carried out by a therapist or under their direction.   Thank you for this referral,    Ld Tinsley, PT Referring Physician Signature: Sridhar MO* _______________________________ Date _____________            PAIN/SUBJECTIVE:   Initial:   0/10 currently. 4-5/10 with endurance activities. Post Session:  0/10   HISTORY:   History of Injury/Illness (Reason for Referral): Pt is s/p lumbar fusion L4/L5 on June 22, 2021. She has already had home health PT and is now here to continue work on strengthening her spine. She states her pain levels improved remarkably after surgery and she is pleased with her function overall. She notes is she \"over does\" her activity she gets LBP. She is restricted to lifting 25 lbs for life. She has mostly been doing exercises given by home health PT and a mild amount of walking. 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 Also has knee pain since being on some of the meds for cancer. Past Medical History/Comorbidities:   Ms. Dallas Rojas  has a past medical history of Arthritis, Back pain with sciatica, Cancer ([facility] [address] COVID-19 vaccine series completed (02/10/2021), Elevated liver enzymes, GERD (gastroesophageal reflux disease), History of EKG (11/11/2016), Hyperlipidemia, Ill-defined condition, Sleep apnea, and Thrombocytopenia ([facility] [address] Ms. Dallas Rojas  has a past surgical history that includes hx bilateral mastectomy; hx colonoscopy; hx endoscopy; hx wisdom teeth extraction; hx tonsillectomy (2019); hx orthopaedic; and hx blepharoplasty. Social History/Living Environment:   Lives with  who is very helpful as needed. Changed bathroom to have higher toilets and bars in showers. No stairs that she has to use. Prior Level of Function/Work/Activity:  Was limited in walking, standing prior to surgery. 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 She has access to a gym at her [employer].     Ambulatory/Rehab Services H2 Model Falls Risk Assessment   Risk Factors:       No Risk Factors Identified Ability to Rise from Chair:       (1)  Pushes up, successful in one attempt   Falls Prevention Plan:       No modifications necessary   Total: (5 or greater = High Risk): 1   ©2010 [facility]. All Rights Reserved. [facility] Patent #[id number]. Federal Law prohibits the replication, distribution or use without written permission from [facility]   Current Medications:     Current Outpatient Medications:     B infantis/B ani/B juli/B bifid (PROBIOTIC 4X PO), Take  by mouth daily. , Disp: , Rfl:     DISABLED PLACARD (DISABLED PLACARD) DMV, Supply prescription to [facility] with completed form [id number]., Disp: , Rfl:     biotin (VITAMIN B7) 5 mg tablet, Take 5 mg by mouth daily. , Disp: , Rfl:     fluticasone propionate (Flonase Allergy Relief) 50 mcg/actuation nasal spray, 2 Sprays by Both Nostrils route as needed. , Disp: , Rfl:     ubidecarenone/vitamin E mixed (COQ10  PO), Take 100 mg by mouth daily. , Disp: , Rfl:     dextran 70-hypromellose (Artificial Tears,[id number],) ophthalmic solution, Administer 2 Drops to both eyes as needed. , Disp: , Rfl:     celecoxib (CELEBREX) 100 mg capsule, TAKE 1 CAPSULE DAILY, Disp: , Rfl:     cholecalciferol (VITAMIN D3) (1000 Units /25 mcg) tablet, Take 1,000 Units by mouth daily. , Disp: , Rfl:     colchicine 0.6 mg tablet, Take 0.6 mg by mouth two (2) times a day. 1/2 pill in the a.m. and 1/2 pill in the p.m., Disp: , Rfl:     gabapentin (NEURONTIN) 100 mg capsule, Take 100 mg by mouth two (2) times a day., Disp: , Rfl:     pravastatin (PRAVACHOL) 40 mg tablet, Take 40 mg by mouth every evening., Disp: , Rfl:     zinc 50 mg tab tablet, Take 50 mg by mouth daily. , Disp: , Rfl:     ethyl alcohol-herbal drugs elix, Take  by mouth two (2) times a day. Juice plus, Disp: , Rfl:     aspirin delayed-release 81 mg tablet, Take  by mouth daily.  Preventative, Disp: , Rfl:    Date Last Reviewed:  7/23/21   Number of Personal Factors/Comorbidities that affect the Plan of Care: 1-2: MODERATE COMPLEXITY EXAMINATION:     Observation/Orthostatic Postural Assessment: incisional scar is well healed in back and the side of her L hip  LQ Standing alignment: JL knee valgus R >L with pronated feet. Spinal alignment: Stands with posterior shifted trunk, increased thoracic kyphosis and forward head posture  Palpation: ROM:   Neck rotation = 60% JL  lumbar    Flexion To ankles   Extension 40%   SB Right 1/2 in above R knee   SB Left 2 in above L knee   Rotation R 45%   Rotation L 40%             Strength:  HIP Right hip  Left hip   Flexion 4- 4-   Extension 3+ 3   Abduction 4- 4-   External rotation 4+ 4+   Internal rotation 4 4-        Special Tests:   Hip Clearing Test: negative    Lumbar Spine Clearing Tests: weak to core flexion, Lumbar Protective Mechanism for trunk flexion = 1/5; vertebral compression test = 1/5- mike at low back   Muscle Length Tests: tight JL psoas    Joint Accessory Mobility testing: stiff to mid thoracic PA and transverse gliding  Neurological Screen: [place] neuro screen is intact for myotomes. See muscle testing above. 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 Neg SLR or prone knee bend tension tests. Functional Mobility:  Independent and not too slow with Sit to/from Stand, Bed Mobility, Walking on level ground, and Car Transfer but demonstrates poor use of core with some of these activities. :  Balance: single leg stance is to be assessed. Body Structures Involved:  1. Joints  2. Muscles Body Functions Affected:  1. Movement Related Activities and Participation Affected:  1. General Tasks and Demands  2.  Domestic Life   Number of elements (examined above) that affect the Plan of Care: 4+: HIGH COMPLEXITY   CLINICAL PRESENTATION:   Presentation: Stable and uncomplicated: LOW COMPLEXITY   CLINICAL DECISION MAKING:   Use of outcome tool(s) and clinical judgement create a POC that gives a: Clear prediction of patient's progress: LOW COMPLEXITY

## 2021-07-23 NOTE — PROGRESS NOTES
Tonio Davis  : 1945  [place] / Product Type: Medicare /  [address] at [facility] [place]  [address] [facility], [address], [place] [address].  [phone]   [phone]       OUTPATIENT PHYSICAL THERAPY: Daily Treatment Note 2021  Visit Count: 1  ICD-10: Treatment Diagnosis: M62.81 generalized weakness  Precautions/Allergies:   Latex, natural rubber; Diphenhydramine hcl; Penicillins; Hydromorphone (bulk); and Povidone-iodine   TREATMENT PLAN:  Effective Dates: 2021 TO 2021 (60 days). Frequency/Duration: 2 times a week for 60 Day(s) MEDICAL/REFERRING DIAGNOSIS:  back pain   DATE OF ONSET: SUrgery 2021  REFERRING PHYSICIAN: Demetrius Rojo*  MD Orders: eval and treat  Return MD Appointment: 2021              Pre-treatment Symptoms/Complaints:  Weak core. Pain in back if I stand or walk too long. Very pleased with surgical results. Pain: Initial:   0/10 Post Session:  0/10   Medications Last Reviewed:  2021    Updated Objective Findings:   See evaluation note from today     TREATMENT:   Therapeutic Exercises:( 12 min)  -- pt education and practice of supported sleeping in supine ~ 4 min  -- transverse abdominus pull in ~ 15 reps. Pt to do 25x 4 daily  -- hook lye unil hip flexion isometric 30 sec ea. Pt to do 10 sec x 3 at home. HEP: as above. Pt given hand out. [employer] Portal    Treatment/Session Summary:    · Response to Treatment:  good return demonstration of HEP. Fatigued easily with hip flexsion isometric at 30 sec so started at 10 sec x 3. .  · Communication/Consultation:  None today  · Equipment provided today:  None today  · Recommendations/Intent for next treatment session: Next visit will focus on progressing HEP and addressing posterior trunk shift with mobilization to thoracic/manual traction and stretching to cervical and posture education training.  .    Total Treatment Billable Duration:  12 min  PT Patient Time In/Time Out  Time In: 1000  Time Out: [address], PT    Future Appointments   Date Time Provider Annabella Barrett   7/30/2021  1:15 PM Sammiemarian Monteiro, PT [facility]   8/5/2021 11:30 AM Sammiemarian Monteiro, PT [facility]   8/6/2021  8:30 AM Sammiemarian Monteiro, PT [facility]   8/9/2021  8:45 AM MD ARELY Koch POA   8/12/2021  2:30 PM Sammiemarian Monteiro, PT [facility]   8/13/2021  8:30 AM Sammiemarian Monteiro, PT [facility]   8/19/2021 10:30 AM Sammiemarian Monteiro, PT [facility]   8/20/2021  8:30 AM Sammiemarian Monteiro, PT [facility]   8/24/2021  2:00 PM Sammiemarian Monteiro, PT [facility]   8/27/2021  8:30 AM Sammiemarian Monteiro, PT [facility]   9/2/2021  1:45 PM Sammiemarian Monteiro, PT [facility]   9/3/2021  8:30 AM Sammie Thania, PT [facility]

## 2021-07-30 ENCOUNTER — HOSPITAL ENCOUNTER (OUTPATIENT)
Dept: PHYSICAL THERAPY | Age: 76
Discharge: HOME | End: 2021-07-30
Payer: MEDICARE

## 2021-07-30 PROCEDURE — 97110 THERAPEUTIC EXERCISES: CPT

## 2021-08-05 ENCOUNTER — HOSPITAL ENCOUNTER (OUTPATIENT)
Dept: PHYSICAL THERAPY | Age: 76
Discharge: HOME | End: 2021-08-05
Payer: MEDICARE

## 2021-08-05 PROCEDURE — 97110 THERAPEUTIC EXERCISES: CPT

## 2021-08-05 NOTE — PROGRESS NOTES
Machelle Mensah  : 1945  [place] / Product Type: Medicare /  [address] at [facility] [place]  [address] [facility], [address], [address] [place] [address]  [phone]   [phone]       OUTPATIENT PHYSICAL THERAPY: Daily Treatment Note 2021  Visit Count: 3  ICD-10: Treatment Diagnosis: M62.81 generalized weakness  Precautions/Allergies:   Latex, natural rubber; Diphenhydramine hcl; Penicillins; Hydromorphone (bulk); and Povidone-iodine   TREATMENT PLAN:  Effective Dates: 2021 TO 2021 (60 days). Frequency/Duration: 2 times a week for 60 Day(s) MEDICAL/REFERRING DIAGNOSIS:  back pain   DATE OF ONSET: SUrgery 2021  REFERRING PHYSICIAN: Osmany Carney*  MD Orders: eval and treat  Return MD Appointment: 2021              Pre-treatment Symptoms/Complaints: had difficulty recalling all exercises. Do use sheets given as a handout. Pain: Initial:   /10 Post Session:  0/10   Medications Last Reviewed:  2021  Updated Objective Findings: poor posture with squatting to change her bed sheets. TREATMENT:   Manual (5 min) to decrease pain  --Manual traction, lower cspine AP glides, OCB release, Muscle Energy Technique for C1/2 dysfunction, levator scap and upper trap stretching  Therapeutic Exercises:( 40 min)  -- pt  practice of hip hinging with squat to fix bed sheets ~ 3 min  -- PPT on ball x 15  -- bridge on ball x 15  -- hook lye unil hip flexion isometric 30 sec ea x 2 jl  -- hook lye isometric push on ball with JL arms and hip flexion 30 sec  -- hip hinge sit to stand x 15  -- modified hip extension 10x  JL  -- chin tucks 10 sec x 5  HEP: assigned pull ins, bridge on ball, trunk flexion isometric with ball in hook lye, stand bent over hip extension, chin tucks   [employer] Portal    Treatment/Session Summary:    · Response to Treatment: pt overall good job with doing exercise review.   Need several cues for learning how to hip hinge. Pt will try to change sheets to see if posture we practiced is helpful to decrase back pain with making bed. · Communication/Consultation:  None today  · Equipment provided today:  None today  · Recommendations/Intent for next treatment session: Next visit will cotinue focus on progressing HEP and addressing posterior trunk shift with mobilization to thoracic/manual traction and stretching to cervical and posture education training. Review posture for making bed and push pull motion as needed, HEP as needed.  Return to prone hip extension--    Total Treatment Billable Duration:  45 min       Callum Drew PT    Future Appointments   Date Time Provider Annabella Barrett   8/5/2021 11:30 AM Jacobsen Sport, PT [facility]   8/6/2021  8:30 AM Jacobsen Sport, PT [facility]   8/9/2021  8:45 AM MD ARELY Fernández [facility]   8/12/2021  2:30 PM Jacobsen Sport, PT [facility]   8/13/2021  8:30 AM Jacobsen Sport, PT [facility]   8/19/2021 10:30 AM Jacobsen Sport, PT [facility]   8/20/2021  8:30 AM Jacobsen Sport, PT [facility]   8/24/2021  2:00 PM Jacobsen Sport, PT [facility]   8/27/2021  8:30 AM Jacobsen Sport, PT [facility]   9/2/2021  1:45 PM Jacobsen Sport, PT [facility]   9/3/2021  8:30 AM Jacobsen Sport, PT [facility]

## 2021-08-06 ENCOUNTER — HOSPITAL ENCOUNTER (OUTPATIENT)
Dept: PHYSICAL THERAPY | Age: 76
Discharge: HOME | End: 2021-08-06
Payer: MEDICARE

## 2021-08-06 PROCEDURE — 97110 THERAPEUTIC EXERCISES: CPT

## 2021-08-06 NOTE — PROGRESS NOTES
Mireya Toledo  : 1945  [place] / Product Type: Medicare /  [address]  at [facility] [place] [address]  [address] [facility], [address], [place] [address].  [phone]   [phone]       OUTPATIENT PHYSICAL THERAPY: Daily Treatment Note 2021  Visit Count: 4  ICD-10: Treatment Diagnosis: M62.81 generalized weakness  Precautions/Allergies:   Latex, natural rubber; Diphenhydramine hcl; Penicillins; Hydromorphone (bulk); and Povidone-iodine   TREATMENT PLAN:  Effective Dates: 2021 TO 2021 (60 days). Frequency/Duration: 2 times a week for 60 Day(s) MEDICAL/REFERRING DIAGNOSIS:  back pain   DATE OF ONSET: SUrgery 2021  REFERRING PHYSICIAN: Samir Worthy MD Orders: eval and treat  Return MD Appointment: 2021              Pre-treatment Symptoms/Complaints: no new c/o. Still having mild LBP after tucking in sheets. Pain: Initial:   /10 Post Session:  0/10   Medications Last Reviewed:  2021  Updated Objective Findings: poor lumbar extensors firing pattern. Tight fascia at lower back.    Tender to R greater trochanter bursa   TREATMENT:   Manual (10 min)   --  myofascial release to lumbar area and along hip crest in stretched position  Therapeutic Exercises:( 30 min)  -- pt  practice of hip hinging with squat ~ 2 min  -- wall stretch 1 min  -- prone hip extension x 5 ea  -- prone hip extension sustained hold with MR ~15 sec ea side x 2  -- abdominal pull in practice with breathing out  -- hook lye alternate hip flexion 16 marches  -- SLR 5x ea side  --  Hook lye Hip flexion isometric for obliques 30 sec ea  -- hook lye unil hip flexion isometric 30 sec ea x 2 lisa   standing:    -- side ways walking with 10 feet x 2   -- hip flexion walking with 2 sec hold ~ 3 min -- worked in hallway  -- modified bent over hip extension review  HEP: to continue pull ins, bridge on ball, trunk flexion isometric with ball in hook lye, stand bent over hip extension, chin tucks, prayer stretch and added hip flexion walking today. Pt also assinged to ice R lateral hip 15 min a few times daily. SHe verbally agreed. [facility] Portal    Treatment/Session Summary:    · Response to Treatment:  Improved soft tissue mobility at lower back. Extensor firing pattern still needs work. Poor trunk control with SLR so will gradually work there. · Communication/Consultation:  None today  · Equipment provided today:  None today  · Recommendations/Intent for next treatment session: Next visit will cotinue focus on progressing HEP and addressing posterior trunk shift and progressing core timing/strength.      Total Treatment Billable Duration:  40 min  PT Patient Time In/Time Out  Time In: 3986  Time Out: [address] [facility],     Future Appointments   Date Time Provider Annabella Barrett   8/9/2021  8:45 AM MD ARELY Damico [facility]   8/12/2021  2:30 PM Hector Mo, PT [facility]   8/13/2021  8:30 AM Hector Mo, PT [facility]   8/19/2021 10:30 AM Hector Mo, PT [facility]   8/20/2021  8:30 AM Hector Mo, PT [facility]   8/24/2021  2:00 PM Hector Mo, PT [facility]   8/27/2021  8:30 AM Hector Mo, PT [facility]   9/2/2021  1:45 PM Hector Mo, PT [facility]   9/3/2021  8:30 AM Hector Mo, PT [facility]

## 2021-08-12 ENCOUNTER — HOSPITAL ENCOUNTER (OUTPATIENT)
Dept: PHYSICAL THERAPY | Age: 76
Discharge: HOME | End: 2021-08-12
Payer: MEDICARE

## 2021-08-12 PROCEDURE — 97110 THERAPEUTIC EXERCISES: CPT

## 2021-08-12 NOTE — PROGRESS NOTES
Simone Correa  : 1945  [place] / Product Type: Medicare /  [address] at [facility] [place]  1101 [facility], [address], [address] [place] [address]  [phone]   [phone]       OUTPATIENT PHYSICAL THERAPY: Daily Treatment Note 2021  Visit Count: 5  ICD-10: Treatment Diagnosis: M62.81 generalized weakness  Precautions/Allergies:   Latex, natural rubber; Diphenhydramine hcl; Penicillins; Hydromorphone (bulk); and Povidone-iodine   TREATMENT PLAN:  Effective Dates: 2021 TO 2021 (60 days). Frequency/Duration: 2 times a week for 60 Day(s) MEDICAL/REFERRING DIAGNOSIS:  back pain   DATE OF ONSET: SUrgery 2021  REFERRING PHYSICIAN: Livia Skaggs MD Orders: eval and treat  Return MD Appointment: 2021              Pre-treatment Symptoms/Complaints: back is doing well today. Doing ball work on floor. Has less pain to R  greater trochanter   Pain: Initial:   0/10 Post Session:  0/10   Medications Last Reviewed:  2021  Updated Objective Findings: poor lumbar extensors firing pattern. Tight fascia at lower back.    Mildly tender to R greater trochanter bursa   TREATMENT:   Manual (0 min)   Therapeutic Exercises:( 40 min)  -- prone hip extension sustained hold with MR ~10 sec ea side x 3  -- abdominal pull in practice with breathing out-- 20 count x 2  -- prone hip extension  With knee bent x 10 ea  -- pt  practice of hip hinging with squat ~ 2 min  -- hook lye alternate hip flexion 16 marches  -- SLR 10x ea side  -- hook lye JL hip flexion isometric 10 sec ea x 3 with min A  -- side lye hip ABD 10x ea side   standing:    -- sit to stand x 10 with hip hinge  -- wall push ups 10x  -- open stance with elbow on wall, hip flexion to open stance 10 x ea side  -- sit to touch down x 10  -- side ways walking with 10 feet x 2   -- hip flexion walking with 3 sec hold ~ 30 ft in hallway  -- open stance yellow band shoulder extension 10x ea side  -- walk with yellow band resistance at waist - forward/back 6 steps ea way x 10 and then marching in place: 10 marches. HEP: to continue pull ins, bridge on ball, trunk flexion isometric with ball in hook lye, stand bent over hip extension, chin tucks, prayer stretch,  added hip flexion walking and wall push ups. Pt also to continue ice R lateral hip 15 min a few times daily. SHe verbally agreed. [facility] Portal    Treatment/Session Summary:    · Response to Treatment:  Pt did well with progressions of core strengthening but fatigues easily. SLR improved today. Had a hard time controlling core with walking against yellow band resistance. · Communication/Consultation:  None today  · Equipment provided today:  None today  · Recommendations/Intent for next treatment session: Next visit will cotinue focus on progressing HEP and addressing posterior trunk shift and progressing core timing/strength. Continue with walking against yellow band.      Total Treatment Billable Duration:  40 min  PT Patient Time In/Time Out  Time In: 1440  Time Out: [place] [address], PT    Future Appointments   Date Time Provider Annabella Barrett   8/13/2021  8:30 AM Ernie Porch, PT [facility]   8/19/2021 10:30 AM Ernie Porch, PT [facility]   8/20/2021  8:30 AM Ernie Porch, PT [facility]   8/24/2021  2:00 PM Ernie Porch, PT [facility]   8/27/2021  8:30 AM Ernie Porch, PT [facility]   9/2/2021  1:45 PM Ernie Porch, PT [facility]   9/3/2021  8:30 AM Ernie Porch, PT [facility]   12/9/2021  8:45 AM MD ARELY Cooper

## 2021-08-13 ENCOUNTER — HOSPITAL ENCOUNTER (OUTPATIENT)
Dept: PHYSICAL THERAPY | Age: 76
Discharge: HOME | End: 2021-08-13
Payer: MEDICARE

## 2021-08-13 PROCEDURE — 97110 THERAPEUTIC EXERCISES: CPT

## 2021-08-13 PROCEDURE — 97530 THERAPEUTIC ACTIVITIES: CPT

## 2021-08-13 NOTE — PROGRESS NOTES
Jocelyn Aguiar  : 1945  [place] / Product Type: Medicare /  [address] at [facility] [place]  1101 [facility], [address], [address] [place] [address]  [phone]   [phone]       OUTPATIENT PHYSICAL THERAPY: Daily Treatment Note 2021  Visit Count: 6  ICD-10: Treatment Diagnosis: M62.81 generalized weakness  Precautions/Allergies:   Latex, natural rubber; Diphenhydramine hcl; Penicillins; Hydromorphone (bulk); and Povidone-iodine   TREATMENT PLAN:  Effective Dates: 2021 TO 2021 (60 days). Frequency/Duration: 2 times a week for 60 Day(s) MEDICAL/REFERRING DIAGNOSIS:  back pain   DATE OF ONSET: SUrgery 2021  REFERRING PHYSICIAN: Karson Summers*  MD Orders: eval and treat  Return MD Appointment: 2021              Pre-treatment Symptoms/Complaints: back has some pain today after changing bed sheets this AM and doing more exercises yesterday. Pain: Initial:   1-2/10 Post Session:  No VAS, no pain increase reported. Medications Last Reviewed:  2021  Updated Objective Findings: no updates   TREATMENT:   Manual (0 min)   Therapetic Activities (23 min)  -- Back education and practice for sitting with hips higher than knees and for bending at hip angle to work on computer, as well as postural choices allowing back and neck to remain straight. Worked with supported and non supported positions at a desk. --  Patient educated and demonstrated understanding with the following functional activities: to not rotate at waist with rotational activities.   Therapeutic Exercises:( 15 min)  -- abdominal pull in practice with breathing out during bridging  -- bridge on ball with core stabilization 15x  -- hook lye ball overhead and back with gentle resistance 10x  -- walk with yellow band resistance at waist - forward 10x, back 10x ea way, marching 10x  HEP: to continue pull ins, bridge on ball, trunk flexion isometric with ball in hook lye, stand bent over hip extension, chin tucks, prayer stretch,  added hip flexion walking and wall push ups. Pt also to continue ice R lateral hip 15 min a few times daily. SHe verbally agreed. [employer] Portal    Treatment/Session Summary:    · Response to Treatment:  Pt with good return demonstration of funcitonal activity principals we covered today. · Communication/Consultation:  None today  · Equipment provided today:  None today  Recommendations/Intent for next treatment session: Next visit will cotinue focus on progressing HEP and addressing posterior trunk shift and progressing core timing/strength. Continue with upgraded core program from last visit and start quadriped work.    Total Treatment Billable Duration:  38 min  PT Patient Time In/Time Out  Time In: 2254  Time Out: [facility],     Future Appointments   Date Time Provider Annabella Barrett   8/19/2021  1:45 PM William Jernigan, PT [facility]   8/20/2021  8:30 AM William Jernigan, PT [facility]   8/24/2021  2:00 PM William Jernigan PT [facility]   8/27/2021  8:30 AM William Jernigan, PT [facility]   9/3/2021  8:30 AM William Jernigan, PT [facility]   9/10/2021  8:30 AM William Jernigan, PT [facility]   12/9/2021  8:45 AM MD ARELY Israel

## 2021-08-19 ENCOUNTER — HOSPITAL ENCOUNTER (OUTPATIENT)
Dept: PHYSICAL THERAPY | Age: 76
Discharge: HOME | End: 2021-08-19
Payer: MEDICARE

## 2021-08-19 PROCEDURE — 97110 THERAPEUTIC EXERCISES: CPT

## 2021-08-19 NOTE — PROGRESS NOTES
Jocelyn Aguiar  : 1945  [place] / Product Type: Medicare /  [address] at [facility] [place]  1101 [facility], [address], [address] [place] [address]  [phone]   [phone]       OUTPATIENT PHYSICAL THERAPY: Daily Treatment Note 2021  Visit Count: 7  ICD-10: Treatment Diagnosis: M62.81 generalized weakness  Precautions/Allergies:   Latex, natural rubber; Diphenhydramine hcl; Penicillins; Hydromorphone (bulk); and Povidone-iodine   TREATMENT PLAN:  Effective Dates: 2021 TO 2021 (60 days). Frequency/Duration: 2 times a week for 60 Day(s) MEDICAL/REFERRING DIAGNOSIS:  back pain   DATE OF ONSET: SUrgery 2021  REFERRING PHYSICIAN: Karson Summers*  MD Orders: eval and treat  Return MD Appointment: 2021              Pre-treatment Symptoms/Complaints: I am tired since I walked a lot at gym but my back is feeling good. Pain: Initial:   -2/10 Post Session:  No VAS, no pain increase reported. Medications Last Reviewed:  2021  Updated Objective Findings: Lumbar Protective Mechanism for trunk flexion = 1/5 with R LE back, 3/5 with L LE back; trunk EXT = 1/5 with L LE back and 2/5 with R LE back. TREATMENT:   Manual (0 min)   Therapetic  Activity (0min)  Therapeutic Exercises:( 38 min)  -- abdominal bracing/ Lumbar Protective Mechanism work with standing open stance with sustained isotonics for trunk flexion and extension ~ 5 min  -- wall push ups 2x10 with correction for braching  -- walk with green band resistance at waist - forward lunches 5 steps x 4, back 5 steps x 4 ea way; then walking backward against resistance/forward marching 2 reps.    -- quadriped hip extension 10x ea side, shoulder HORZ ABD 10x ea side  -- hook lye ball press into uni hip flexion 10x ea side  HEP: to continue pull ins, bridge on ball, trunk flexion isometric with ball in hook lye, stand bent over hip extension, chin tucks, prayer stretch,  added hip flexion walking and wall push ups. Added walking against band and quadriped work, hip flexion facing wall. [employer] Portal    Treatment/Session Summary:    · Response to Treatment:  Having difficulty with bracing trunk enough to have good balance with resistance- as done against theraband today. Will need further practice. · Communication/Consultation:  None today  · Equipment provided today:  None today  Recommendations/Intent for next treatment session: Next visit will cotinue focus on progressing HEP and addressing posterior trunk shift and progressing core timing/strength. Review new HEP assigned.    Total Treatment Billable Duration:  38 min  PT Patient Time In/Time Out  Time In: 5994  Time Out: [address], PT    Future Appointments   Date Time Provider Annabella Barrett   8/20/2021  8:30 AM Jett Serrano, PT [facility]   8/24/2021  2:00 PM [facility]ark, [facility]   8/27/2021  8:30 AM Jettmavis Serrano, PT [facility]   9/3/2021  8:30 AM Jett Hark, PT [facility]   9/10/2021  8:30 AM Jett Hark, PT [facility]   12/9/2021  8:45 AM MD ARELY Vences

## 2021-08-20 ENCOUNTER — HOSPITAL ENCOUNTER (OUTPATIENT)
Dept: PHYSICAL THERAPY | Age: 76
Discharge: HOME | End: 2021-08-20
Payer: MEDICARE

## 2021-08-20 PROCEDURE — 97530 THERAPEUTIC ACTIVITIES: CPT

## 2021-08-20 PROCEDURE — 97110 THERAPEUTIC EXERCISES: CPT

## 2021-08-20 NOTE — PROGRESS NOTES
Myesha Queta  : 1945  [place] / Product Type: Medicare /  [address]  at [facility] [place]  1101 [facility], [address], [place] [address].  [phone]   [phone]       OUTPATIENT PHYSICAL THERAPY: Daily Treatment Note 2021  Visit Count: 8  ICD-10: Treatment Diagnosis: M62.81 generalized weakness  Precautions/Allergies:   Latex, natural rubber; Diphenhydramine hcl; Penicillins; Hydromorphone (bulk); and Povidone-iodine   TREATMENT PLAN:  Effective Dates: 2021 TO 2021 (60 days). Frequency/Duration: 2 times a week for 60 Day(s) MEDICAL/REFERRING DIAGNOSIS:  back pain   DATE OF ONSET: SUrgery 2021  REFERRING PHYSICIAN: Melissa Fleming*  MD Orders: eval and treat  Return MD Appointment: 2021              Pre-treatment Symptoms/Complaints: back a mildly sore after doing legs kicking back, but overall OK. Wrists a little sore as well  Pain: Initial:   -2/10 Post Session:  No VAS, no pain increase reported. Medications Last Reviewed:  2021  Updated Objective Findings: 21Lumbar Protective Mechanism for trunk flexion = 1/5 with R LE back, 3/5 with L LE back; trunk EXT = 1/5 with L LE back and 2/5 with R LE back.     TREATMENT:   Manual (0 min)   Therapetic  Activity (10 min)  -- push-pull practice with verbal and tactile feed back  -- pt eduction and practice for vacuuming with proper posture, push-pull with trunk   -- turning with full trunk and pivot off footpractice against band  For practice to not turn hips/shoulder opposite  Therapeutic Exercises:( 30  min)  -- hook lye ball press into uni hip flexion 20 count hold 3x ea side  -- wall push [id number] with correction for bracing  -- knee flexion to wall on, with forearms on wall 10x ea side  -- stand bent over hip extension 5x ea side, shoulder HORZ ABD 10x ea side; then opposite arm to LE 3x ea side  -- 10; stand to sit tap down x 10; squat x 10  -- walk with green band resistance at waist - forward lunches 5 steps x 4, back 5 steps x 4 ea way; then walking backward against resistance 2 reps. HEP: to continue updated HEP. Will not do quadriped, but modified bent over opp arm to leg. [facility] Portal    Treatment/Session Summary:    · Response to Treatment:  Good review of exercises. Will be working towards doing opp arm to LE in modified bent over rather than quadriped because of wrist pain with quadriped. · Communication/Consultation:  None today  · Equipment provided today:  None today  Recommendations/Intent for next treatment session: Next visit will cotinue focus on progressing HEP and addressing posterior trunk shift and progressing core timing/strength. Progress strengthening. Look at hip ABD strength. Shorten HEP if needed. Continue training for core bracing with standing at sink and reach overhead now.    Total Treatment Billable Duration:  40 min  PT Patient Time In/Time Out  Time In: 830  Time Out: ROBERTO Kirkpatrick    Future Appointments   Date Time Provider Annabella Barrett   2021  2:00 PM Harlow Apgar, [place] [facility]   2021  8:30 AM Harlow Apgar, PT [facility]   9/3/2021  8:30 AM Harlow Apgar, PT [facility]   9/10/2021  8:30 AM Harlow Apgar, PT [facility]   2021  8:45 AM MD ARELY Prieto

## 2021-08-24 ENCOUNTER — HOSPITAL ENCOUNTER (OUTPATIENT)
Dept: PHYSICAL THERAPY | Age: 76
Discharge: HOME | End: 2021-08-24
Payer: MEDICARE

## 2021-08-24 PROCEDURE — 97530 THERAPEUTIC ACTIVITIES: CPT

## 2021-08-24 PROCEDURE — 97110 THERAPEUTIC EXERCISES: CPT

## 2021-08-24 NOTE — PROGRESS NOTES
Brigitte Surjit  : 1945  [place] / Product Type: Medicare /  [address]  at [facility] [place]  1101 [facility], [address], [place] [address].  [phone]   [phone]       OUTPATIENT PHYSICAL THERAPY: Daily Treatment Note 2021  Visit Count: 9  ICD-10: Treatment Diagnosis: M62.81 generalized weakness  Precautions/Allergies:   Latex, natural rubber; Diphenhydramine hcl; Penicillins; Hydromorphone (bulk); and Povidone-iodine   TREATMENT PLAN:  Effective Dates: 2021 TO 2021 (60 days). 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 Pain: Initial:   no VAS today Post Session:  No VAS, no pain increase reported. Medications Last Reviewed:  2021  Updated Objective Findings:  Hip ABD R 3+, L4-/5    TREATMENT:   Manual (0 min)   Therapetic  Activity (9 min)  -- pt practice for posture with standing at sink to wash dishes ~ 7 min  --pt practice for overhead reaching trunk brace ~ 2 min ea side  Therapeutic Exercises:( 30  Min)  MR -= manual resistance  -- side lye JL psoas and quad stretch 3 ' x 5 ea  -- side lye hip ABD 10x ea side   -- Prone hip ext prolonged hold 3x ea side with MR  --  stand to sit tap down x 10; squat x 10  -- wall push ups review  -- knee flexion to wall on, with forearms on wall 10x ea side  -- stand bent over hip extension with opposite arm to LE 2x 5  HEP: to continue updated HEP. Will not do quadriped, but modified bent over opp arm to leg.    [employer] Portal    Treatment/Session Summary:    · Response to Treatment:  Pt progressing with therapeutic activity posture training and now can focus on applying/reviewing principals. May continue to need HEP review secondary to stating she has poor recall. · Communication/Consultation:  None today  · Equipment provided today:  None today  Recommendations/Intent for next treatment session: Next visit will cotinue focus on reviewing, progressing HEP , activities, and addressing posterior trunk shift and progressing core timing/strength. Progress strengthening.   Total Treatment Billable Duration:  39 min  PT Patient Time In/Time Out  Time In: 1405  Time Out: [address], PT    Future Appointments   Date Time Provider Annabella Barrett   8/27/2021  8:30 AM [facility]itez, [facility]   9/3/2021  8:30 AM [facility]   9/10/2021  8:30 AM Genetta Benitez, PT [facility]   12/9/2021  8:45 AM MD ARELY Zafar

## 2021-08-27 ENCOUNTER — APPOINTMENT (OUTPATIENT)
Dept: PHYSICAL THERAPY | Age: 76
End: 2021-08-27
Payer: MEDICARE

## 2021-09-02 ENCOUNTER — APPOINTMENT (OUTPATIENT)
Dept: PHYSICAL THERAPY | Age: 76
End: 2021-09-02
Payer: MEDICARE

## 2021-09-03 ENCOUNTER — HOSPITAL ENCOUNTER (OUTPATIENT)
Dept: PHYSICAL THERAPY | Age: 76
Discharge: HOME | End: 2021-09-03
Payer: MEDICARE

## 2021-09-03 PROCEDURE — 97110 THERAPEUTIC EXERCISES: CPT

## 2021-09-03 NOTE — PROGRESS NOTES
Cecilia Cabezas  : 1945  [place] / Product Type: Medicare /  [address]  at [facility] [place]  1101 [facility], [address], [place] [address].  [phone]   [phone]       OUTPATIENT PHYSICAL THERAPY: Daily Treatment Note 9/3/2021  Visit Count: 10  ICD-10: Treatment Diagnosis: M62.81 generalized weakness  Precautions/Allergies:   Latex, natural rubber; Diphenhydramine hcl; Penicillins; Hydromorphone (bulk); and Povidone-iodine   TREATMENT PLAN:  Effective Dates: 2021 TO 2021 (60 days). Frequency/Duration: 2 times a week for 60 Day(s) MEDICAL/REFERRING DIAGNOSIS:  back pain   DATE OF ONSET: SUrgery 2021  REFERRING PHYSICIAN: Ronald Olivera*  MD Orders: eval and treat  Return MD Appointment: 2021              Pre-treatment Symptoms/Complaints states she is feeling well today. Pain: Initial:   no VAS today Post Session:  No VAS, no pain increase reported. Medications Last Reviewed:  2021  Updated Objective Findings:  Hip ABD R 3+, L4-/5    TREATMENT:   Manual (0 min)   Therapetic  Activity (0 min)  Therapeutic Exercises:( 39  Min)  MR -= manual resistance  -- HEP review with additions as appropriate: MR to hip flexion unilateral 20 sec ea; swiss ball JL hip flexion MR 20 sec/LTR slow and controlled/ bridge/ JL hip and shoulder press into ball simutaneously  -- side lye JL LE kick forward and back for active hamstring, psoas and quad stretch   -- side lye hip ABD 10x ea side   -- Prone hip ext prolonged hold 3x ea side with MR  --  stand to sit tap down x 10; squat x 10  -- wall push ups   -- knee flexion to wall on, with forearms on wall 10x ea side  -- stand bent over hip extension with opposite arm to LE 2x 5  -- verbal review of sit to stand, wall push ups and walking against theraband OR hip flexion to wall  HEP: to continue updated HEP as practiced today.    [facility] Portal    Treatment/Session Summary:    · Response to Treatment:  Pt with good return demonstration of HEP with updates as well. May need more review of this. · Communication/Consultation:  None today  · Equipment provided today:  None today  Recommendations/Intent for next treatment session: Next visit will cotinue focus on reviewing functional posture principals with ADL's and  HEP as needed.    Total Treatment Billable Duration:  45 min  PT Patient Time In/Time Out  Time In: 7075  Time Out: [address] PT    Future Appointments   Date Time Provider Annabella Barrett   9/10/2021  8:30 AM Valere Alamin, PT [facility]   9/17/2021  8:30 AM Valere Alamin, PT [facility]   9/21/2021  1:45 PM Valere Alamin, PT [facility]   12/9/2021  8:45 AM MD ARELY Pool

## 2021-09-03 NOTE — PROGRESS NOTES
Ruddy Green  : 1945  [place] / Product Type: Medicare /  [address] at [facility]VIED[facility]  [facility], [address], [place] [address].  [phone]   [phone]       OUTPATIENT PHYSICAL THERAPY:Progress Report 9/3/2021     ICD-10: Treatment Diagnosis: M62.81 generalized weakness  Precautions/Allergies:   Latex, natural rubber; Diphenhydramine hcl; Penicillins; Hydromorphone (bulk); and Povidone-iodine   TREATMENT PLAN:  Effective Dates: 2021 TO 2021 (60 days). Frequency/Duration: 2 times a week for 60 Day(s)  Pt started PT on  and has completed 10 visits. MEDICAL/REFERRING DIAGNOSIS:  back pain   DATE OF ONSET: SUrgery 2021  REFERRING PHYSICIAN: William Mcdonald MD Orders: eval and treat  Return MD Appointment: 2021     9/3/21 ASSESSMENT:  Ruddy Green is s/p L4/5 fusion on 21. She has made progress with standing, walking abilities and states that she no longer has any pain with current activities. She presented with very weak core stabilizers and this has progressed but is still not WNL and she fatigues easily. Her  postural awareness has improved as well but we continue to work on how this relates to her core strategies with functional activities. Ms. Glenda Sims also is having some mild L greater trochanter pain that is limiting some of her progress. She will benefit from PT for guided rehab to promote normalized return of motion, strength, and function in order for safe return to home chores, standing and walking. PROBLEM LIST (Impacting functional limitations):  1. Pain in back with some functional activities  2. Poor core endurance  3. Decreased functional strength JL hip/LE   4. Decreased gait endurance and balance skills INTERVENTIONS PLANNED:  1. Modalities PRN, including ultrasound, estim, and iontophoresis  2.  Soft tissue and joint mobilization for ROM and flexibility  3. Stretching, progressive resistive exercises and HEP for return to functional activities. 4. Back Education and Training for body mechanics with activities of daily living  5. If needed, aquatic therapy. GOALS: (Goals have been discussed and agreed upon with patient.)   Short-Term Functional Goals: Time Frame: 4 weeks  1. Report no more than minimal, intermittent 3/10 pain with standing and walking 15 min. MET 9/3/21  2. Demonstrates good posture with standing, working at computer, driving and sleeping. MET 9/3/21  3. JL hip ABD and IR strength is 5/5 for improved stability with static and dynamic balance, walking, and progressing into strengthening phase. PROGRESSED 9/3/21  4. Independent with initial level HEP. MET 9/3/21  Discharge Goals: Time Frame: 8 weeks ALL DISCHARGE GOALS have progressed but not yet met consistently 9/3/21  1. No significant pain in back with all normalized daily home and work activities, and less than 3/50 on Oswestry. 2. Able to walk, stand at least 30 minutes with min to no pain in back. 3. Able to balance with good control in single-leg stand greater than 15 seconds with eyes open, greater than 3 seconds with eyes closed for improved dynamic stability. 4. Demonstrates good use of core strategies with functional activities (squat, push-pull, lift). 5. Independent with HEP for advanced hip strengthening. OUTCOME MEASURE:   Tool Used: Modified Oswestry Low Back Pain Questionnaire  Score:  Initial: 6/50  Most Recent: 9/50 (Date: 9/3/21)   Interpretation of Score: Each section is scored on a 0-5 scale, 5 representing the greatest disability. The scores of each section are added together for a total score of 50. MEDICAL NECESSITY:   · Patient is expected to demonstrate progress in strength, functional technique and endurance to increase independence with functional activities, walking without stressing inappropriately stressing back. .  REASON FOR SERVICES/OTHER COMMENTS:  · Patient continues to require skilled intervention due to weakness in core/hips, poor postural awareness and decreased functional abilities, need for guided rehab. .  Total Duration:  PT Patient Time In/Time Out  Time In: 0835    Rehabilitation Potential For Stated Goals: Excellent  Regarding Donny Galeas's therapy, I certify that the treatment plan above will be carried out by a therapist or under their direction. Thank you for this referral,    Cintia Lao, PT                   PAIN/SUBJECTIVE:   Initial:   0/10 currently. Post Session:  0/10   HISTORY:      Ambulatory/Rehab Services H2 Model Falls Risk Assessment   Risk Factors:       No Risk Factors Identified Ability to Rise from Chair:       (1)  Pushes up, successful in one attempt   Falls Prevention Plan:       No modifications necessary   Total: (5 or greater = High Risk): 1   ©2010 [facility] [email or web address]. All Rights Reserved. [facility] Patent #[id number]. Federal Law prohibits the replication, distribution or use without written permission from [facility] [employer]   Current Medications:     Current Outpatient Medications:     B infantis/B ani/B juli/B bifid (PROBIOTIC 4X PO), Take  by mouth daily. , Disp: , Rfl:     DISABLED PLACARD (DISABLED PLACARD) DMV, Supply prescription to [facility] with completed form [id number]., Disp: , Rfl:     biotin (VITAMIN B7) 5 mg tablet, Take 5 mg by mouth daily. , Disp: , Rfl:     fluticasone propionate (Flonase Allergy Relief) 50 mcg/actuation nasal spray, 2 Sprays by Both Nostrils route as needed. , Disp: , Rfl:     ubidecarenone/vitamin E mixed (COQ10  PO), Take 100 mg by mouth daily. , Disp: , Rfl:     dextran 70-hypromellose (Artificial Tears,[id number],) ophthalmic solution, Administer 2 Drops to both eyes as needed. , Disp: , Rfl:     celecoxib (CELEBREX) 100 mg capsule, TAKE 1 CAPSULE DAILY, Disp: , Rfl:     cholecalciferol (VITAMIN D3) (1000 Units /25 mcg) tablet, Take 1,000 Units by mouth daily. , Disp: , Rfl:     colchicine 0.6 mg tablet, Take 0.6 mg by mouth two (2) times a day. 1/2 pill in the a.m. and 1/2 pill in the p.m., Disp: , Rfl:     gabapentin (NEURONTIN) 100 mg capsule, Take 100 mg by mouth two (2) times a day., Disp: , Rfl:     pravastatin (PRAVACHOL) 40 mg tablet, Take 40 mg by mouth every evening., Disp: , Rfl:     zinc 50 mg tab tablet, Take 50 mg by mouth daily. , Disp: , Rfl:     ethyl alcohol-herbal drugs elix, Take  by mouth two (2) times a day. Juice plus, Disp: , Rfl:     aspirin delayed-release 81 mg tablet, Take  by mouth daily. Preventative, Disp: , Rfl:    Date Last Reviewed:  9/3/21   EXAMINATION:     Observation/Orthostatic Postural Assessment: incisional scar is well healed in back and the side of her L hip  LQ Standing alignment: JL knee valgus R >L with pronated feet. Spinal alignment: Stands with posterior shifted trunk, increased thoracic kyphosis and forward head posture but she has learned to improve her standing posture   Vertebral Compression Test = 3/5 with current postural corrections  Palpation: tender to R greater  trochanter         Strength:  HIP Right hip  Left hip   Flexion 4 4   Extension 4- 4-   Abduction 4- 4   External rotation 5 5   Internal rotation 4+ 4+        Special Tests:   Hip Clearing Test: negative    Lumbar Spine Clearing Tests: weak to core flexion, Lumbar Protective Mechanism for trunk flexion = 2/5; vertebral compression test = 3/5  Muscle Length Tests: tight JL psoas    Joint Accessory Mobility testing: not assessed today  Neurological Screen: [place] neuro screen is intact for myotomes. See muscle testing above. 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 Neg SLR or prone knee bend tension tests.    Functional Mobility:  Independent and not too slow with Sit to/from Stand, Bed Mobility, Walking on level ground, and Car Transfer :  Balance: single leg stance on level ground, eyes open = 12-15 sec but with some decrease in core stability

## 2021-09-10 ENCOUNTER — HOSPITAL ENCOUNTER (OUTPATIENT)
Dept: PHYSICAL THERAPY | Age: 76
Discharge: HOME | End: 2021-09-10
Payer: MEDICARE

## 2021-09-10 PROCEDURE — 97530 THERAPEUTIC ACTIVITIES: CPT

## 2021-09-10 PROCEDURE — 97110 THERAPEUTIC EXERCISES: CPT

## 2021-09-10 PROCEDURE — 97140 MANUAL THERAPY 1/> REGIONS: CPT

## 2021-09-10 NOTE — PROGRESS NOTES
Lesa Santos  : 1945  [place] / Product Type: Medicare /  [address]  [facility]  [address] [place] [address], [address], [place] [address].  [phone]   [phone]       OUTPATIENT PHYSICAL THERAPY: Daily Treatment Note 9/10/2021  Visit Count: 11  ICD-10: Treatment Diagnosis: M62.81 generalized weakness  Precautions/Allergies:   Latex, natural rubber; Diphenhydramine hcl; Penicillins; Hydromorphone (bulk); and Povidone-iodine   TREATMENT PLAN:  Effective Dates: 2021 TO 2021 (60 days). Frequency/Duration: 2 times a week for 60 Day(s) MEDICAL/REFERRING DIAGNOSIS:  back pain   DATE OF ONSET: SUrgery 2021  REFERRING PHYSICIAN: Karsten Barbosa MD Orders: eval and treat  Return MD Appointment: 2021              Pre-treatment Symptoms/Complaints back hurts occasionally but overall ok. Will start walking in the morning with goal of 10-15 min.- multi level. Pain: Initial:   no VAS today Post Session:  No VAS, no pain increase reported. Medications Last Reviewed:  2021  Updated Objective Findings:  Hip ABD R 3+, L4-/5  Tender at R  iliotibial band - distal and back towards hamstring and at R glute med   TREATMENT:   Manual (13 min)   --  myofascial release to R  Iliotibial  Band  --  myofascial release to low back and R glute med  Therapetic  Activity (10 min)  -- review and practice of good form with squatting with heavier and lighter items and with awkward positions. -- review and practice of good posture with non supported sitting.   Therapeutic Exercises:( 15 Min)  MR -= manual resistance  -- side lye JL LE kick forward and back for active hamstring, psoas and quad stretch   -- side lye hip ABD 10x ea side R 0#,  L 1#  -- open stance facing wall with psoas stretch 30 sec, opposite arm elevated - JL sides  -- open stance facing wall to hip flexion with opposite arm going from flexion to neutral 10x slowly JL    HEP: to continue updated HEP as practiced today. [facility] Portal    Treatment/Session Summary:    · Response to Treatment:  Pt needed only min cues for most of the there activity review. Vertebral Compression Test for sitting unsupported was 4/5. Having R glute med trigger point pain that may be affecting hip strength. · Communication/Consultation:  None today  · Equipment provided today:  None today  Recommendations/Intent for next treatment session: Next visit will cotinue focus on reviewing HEP, there activities. May benefit from trigger point release to R glute med/low back.    Total Treatment Billable Duration:  43 min  PT Patient Time In/Time Out  Time In: 6710  Time Out: [address] [facility] [address] [facility] [address], PT    Future Appointments   Date Time Provider Annabella Barrett   9/17/2021  8:30 AM Yarelis Fenton, PT [facility] [place]   9/21/2021  1:45 PM Yarelis Fenton, PT [facility] [place]   12/9/2021  8:45 AM MD ARELY Bernstein

## 2021-09-17 ENCOUNTER — HOSPITAL ENCOUNTER (OUTPATIENT)
Dept: PHYSICAL THERAPY | Age: 76
Discharge: HOME | End: 2021-09-17
Payer: MEDICARE

## 2021-09-17 PROCEDURE — 97140 MANUAL THERAPY 1/> REGIONS: CPT

## 2021-09-17 PROCEDURE — 97110 THERAPEUTIC EXERCISES: CPT

## 2021-09-17 NOTE — PROGRESS NOTES
Daisy Oleary  : 1945  [place] / Product Type: Medicare /  [address] at [facility] [place]  [address] [facility], [address], [place] [address].  [phone]   [phone]       OUTPATIENT PHYSICAL THERAPY: Daily Treatment Note 2021  Visit Count: 12  ICD-10: Treatment Diagnosis: M62.81 generalized weakness  Precautions/Allergies:   Latex, natural rubber; Diphenhydramine hcl; Penicillins; Hydromorphone (bulk); and Povidone-iodine   TREATMENT PLAN:  Effective Dates: 2021 TO 2021 (60 days). Frequency/Duration: 2 times a week for 60 Day(s) MEDICAL/REFERRING DIAGNOSIS:  back pain   DATE OF ONSET: SUrgery 2021  REFERRING PHYSICIAN: Antwan Conteh*  MD Orders: eval and treat  Return MD Appointment: 2021              Pre-treatment Symptoms/Complaints  Can do most of the activities required of her. Has questions on HEP. Back and R hip still gets umcomfortable or fatigued. Pain: Initial:   0/10 Post Session:  No VAS, no pain increase reported. Medications Last Reviewed:  2021  Updated Objective Findings:  Hip ABD R 3+, L4-/5    TREATMENT:   Manual (15 min)   -- tool assisted soft tissue release to R glute med and piriformis and L5 multifidus  --  myofascial release to low back, R glute med and  Iliotibial  Band on stretch  Therapetic  Activity (0 min)  Therapeutic Exercises:( 25 Min)  MR -= manual resistance  -- side lye R LE kick forward and back for active hamstring, psoas and quad stretch - 5x  -- prone R quad stretch with rope  -- side lye hip ABD JL 0# 2x 10 with focus on form  -- side stepping with green band 5 steps x 4 ea way  -- open stance facing wall to hip flexion with opposite arm going from flexion to neutral 10x slowly JL with focus on form  HEP: to continue updated HEP as practiced today.    [facility] Portal  Treatment/Session Summary:    · Response to Treatment:  Hip ABD R 3+, L4-/5 after manual work R hip ABD was closer to 4-/5  · Communication/Consultation:  None today  · Equipment provided today:  None today  Recommendations/Intent for next treatment session: Next visit will cotinue focus on reviewing HEP , ther activities with updates as needed.   Assess trigger point release work to R glute med   Total Treatment Billable Duration:  40 min  PT Patient Time In/Time Out  Time In: 1535  Time Out: [address], PT    Future Appointments   Date Time Provider Annabella Barrett   2021  1:45 PM Harlow Apgar, PT [facility]   10/1/2021  8:30 AM [facility]   10/8/2021  9:45 AM Harlow Apgar, PT [facility]   10/15/2021  8:30 AM Caleb Apgar, PT [facility]   2021  8:45 AM MD ARELY Prieto

## 2021-09-21 ENCOUNTER — HOSPITAL ENCOUNTER (OUTPATIENT)
Dept: PHYSICAL THERAPY | Age: 76
Discharge: HOME | End: 2021-09-21
Payer: MEDICARE

## 2021-09-21 PROCEDURE — 97110 THERAPEUTIC EXERCISES: CPT

## 2021-09-21 NOTE — PROGRESS NOTES
Keely Membreno  : 1945  [place] / Product Type: Medicare /  [address] at [facility]  [address] [facility], [address], [address] [place] [address]  [phone]   [phone]       OUTPATIENT PHYSICAL THERAPY: Daily Treatment Note 2021  Visit Count: 13  ICD-10: Treatment Diagnosis: M62.81 generalized weakness  Precautions/Allergies:   Latex, natural rubber; Diphenhydramine hcl; Penicillins; Hydromorphone (bulk); and Povidone-iodine   TREATMENT PLAN:  Effective Dates: 2021 TO 2021 (60 days). Frequency/Duration: 2 times a week for 60 Day(s) MEDICAL/REFERRING DIAGNOSIS:  back pain   DATE OF ONSET: SUrgery 2021  REFERRING PHYSICIAN: Shaw Menendez MD Orders: eval and treat  Return MD Appointment: 2021              Pre-treatment Symptoms/Complaints  Had a fall out of her chair onto her L hip because a rolling chair got caught on a rubber itzel. Mild L hip pain after fall. Back and R hip still gets umcomfortable or fatigued.    Pain: Initial:   0/10 Post Session:  0/10    Medications Last Reviewed:  2021  Updated Objective Findings:  Hip ABD R L4-/5 today   TREATMENT:   Manual (min)   Therapetic  Activity (0 min)  Therapeutic Exercises:( 40 Min)  MR -= manual resistance  -- prone R quad stretch with rope and contract relax 5-7 reps ea side  -- self trigger point release to R glute med in long sit and to quads - tried 2 different balls  -- side lye hip ABD R 0# 2x 10 with focus on form  -- theraball ex review and progression in supine: unilteral hip flex isometric 10 sec x 3 ea side, bridge with SAQ x 10, LTR with progression of arms overhead x 10  -- hook lie hip flexion march x 10  -- sitting chin tuck 10 sec x 10  -- open stance facing wall to hip flexion with opposite arm going from flexion to neutral 10x slowly R only with focus on form  -- step up 4\" with R LE and LE hip flexion 10x  HEP: to continue updated HEP as practiced today. [facility] Portal  Treatment/Session Summary:    · Response to Treatment:  R hip ABD still not WNL but does show some improvement. Increased exercises with focus on R LE chain and she did OK with these. Also added some progressions. She will be away for about 1.5 weeks so will have time to practice changes. Did multiple verbal and visual reviews and well as demonstrations today because pt needed this for memory. · Communication/Consultation:  None today  · Equipment provided today:  None today  Recommendations/Intent for next treatment session: Next visit will cotinue focus on reviewing HEP with updates as able. Needs continued progression for core and R LE posterior chain.    Total Treatment Billable Duration:  40 min  PT Patient Time In/Time Out  Time In: 8461  Time Out: [address], PT    Future Appointments   Date Time Provider Annabella Barrett   10/1/2021  8:30 AM Jett Fernandok, PT [facility]   10/8/2021  9:45 AM Jett Zach, PT [facility]   10/15/2021  8:30 AM Jett Zach, PT [facility]   12/9/2021  8:45 AM MD ARELY Bone

## 2021-10-01 ENCOUNTER — HOSPITAL ENCOUNTER (OUTPATIENT)
Dept: PHYSICAL THERAPY | Age: 76
Discharge: HOME | End: 2021-10-01
Payer: MEDICARE

## 2021-10-01 PROCEDURE — 97110 THERAPEUTIC EXERCISES: CPT

## 2021-10-01 PROCEDURE — 97140 MANUAL THERAPY 1/> REGIONS: CPT

## 2021-10-01 NOTE — PROGRESS NOTES
Maude Miranda  : 1945  [place] / Product Type: Medicare /  [address] at [facility] [place]  [address] [facility], [address], [place] [address].  [phone]   [phone]       OUTPATIENT PHYSICAL THERAPY: Daily Treatment Note 10/1/2021  Visit Count: 14  ICD-10: Treatment Diagnosis: M62.81 generalized weakness  Precautions/Allergies:   Latex, natural rubber; Diphenhydramine hcl; Penicillins; Hydromorphone (bulk); and Povidone-iodine   TREATMENT PLAN:  Effective Dates: 2021 TO 2021 (60 days). Frequency/Duration: 2 times a week for 60 Day(s) MEDICAL/REFERRING DIAGNOSIS:  back pain   DATE OF ONSET: SUrgery 2021  REFERRING PHYSICIAN: Master Starr*  MD Orders: eval and treat  Return MD Appointment: 2021              Pre-treatment Symptoms/Complaints  L hip is better and had massage which helped. Back has been doing OK but has some slight LBP for unknown reason. Has not had time to look for ball. Was out of town last week. Pain: Initial:   1-2/10 in low back; Post Session:  0/10    Medications Last Reviewed:  10/1 /2021 no changes  Updated Objective Findings:  Hip ABD R 4/5, L4+/5 ; weak R hip extension pattern compared to L    TREATMENT:   Manual (8 min)  myofascial release for lower JL lumbar area but especially at Right L5  Therapetic  Activity (0 min)  Therapeutic Exercises:( 32 Min)  MR -= manual resistance  -- hook lie hip flexion , then hold one LE at 90/90 with hand and uni hip flexion 10x ea side  -- sitting chin tuck 10 sec x 10  -- open stance facing wall to hip flexion with opposite arm going from flexion to neutral 10x2 ea side  -- isotonics for prone hip extension to exhaustion with MR- Jl sides ~ 10x ea side  -- step up 4\" with R LE and LE hip flexion 10x2 ea side with addition of opposite arm elevation as able   HEP: to continue updated HEP as practiced today.    [facility] Portal  Treatment/Session Summary:    · Response to Treatment:  R hip extension pattern and core stability still has poor timing and is weak compared to L. Will continue focus on this. Pt needs slow progressions because of memory issues. · Communication/Consultation:  None today  · Equipment provided today:  None today  Recommendations/Intent for next treatment session: Next visit will cotinue focus on reviewing HEP with updates as able. Needs continued progression for core and R LE posterior chain.    Total Treatment Billable Duration:  40 min  PT Patient Time In/Time Out  Time In: 7292  Time Out: ROBERTO Kirkpatrick    Future Appointments   Date Time Provider Annabella Barrett   10/8/2021  9:45 AM Tobin Hylan, PT [facility]   10/15/2021  8:30 AM Tobin Hylan, PT [facility]   10/22/2021  8:30 AM Tobin Hylan, PT [facility]   10/29/2021  8:30 AM Tobin Hylan, PT [facility]   12/9/2021  8:45 AM MD ARELY Donovan

## 2021-10-08 ENCOUNTER — HOSPITAL ENCOUNTER (OUTPATIENT)
Dept: PHYSICAL THERAPY | Age: 76
Discharge: HOME | End: 2021-10-08
Payer: MEDICARE

## 2021-10-08 PROCEDURE — 97110 THERAPEUTIC EXERCISES: CPT

## 2021-10-08 PROCEDURE — 97530 THERAPEUTIC ACTIVITIES: CPT

## 2021-10-08 NOTE — THERAPY RECERTIFICATION
Catrachita Charles  : 1945  [place] / Product Type: Medicare /  [address] at [facility]  1101 [facility], [address] [place] [address],[address], [address] [facility]  [phone]   [phone]       OUTPATIENT PHYSICAL THERAPY:Recertification    Please back date to 10/1/21   ICD-10: Treatment Diagnosis: M62.81 generalized weakness  Precautions/Allergies:   Latex, natural rubber; Diphenhydramine hcl; Penicillins; Hydromorphone (bulk); and Povidone-iodine   TREATMENT PLAN:  Effective Dates:10/1/21 to 21 (60 days). Frequency/Duration: 2 times a week for 60 Day(s)  Pt started PT on  and has completed 14 visits. MEDICAL/REFERRING DIAGNOSIS:  back pain   DATE OF ONSET: SUrgery 2021  REFERRING PHYSICIAN: Marleny Rodrigues*  MD Orders: eval and treat  Return MD Appointment: 2021      ASSESSMENT for 10/1/21:  Catrachita Charles is s/p L4/5 fusion on 21. She has made progress with standing, walking abilities and states that usually does not have with current activities but does struggle with standing longer periods and sitting more than 30 minutes. She presented with very weak core stabilizers and this has progressed but is still fairly weak to core muscle stabilizers and she fatigues easily. Her  postural awareness has improved as well but we continue to work on how this relates to her core strategies with functional activities. We had started decreasing her visits to 1 time weekly, but realize that because of poor memory retention, she will need to return to 2x week when able. She will benefit from continue PT for guided rehab to promote normalized return of motion, strength, and function in order for safe return to home chores, standing and walking. PROBLEM LIST (Impacting functional limitations):  1. Pain in back with some functional activities  2. Poor core endurance  3.  Decreased functional strength JL hip/LE 4. Decreased gait endurance and balance skills INTERVENTIONS PLANNED:  1. Modalities PRN, including ultrasound, estim, and iontophoresis  2. Soft tissue and joint mobilization for ROM and flexibility  3. Stretching, progressive resistive exercises and HEP for return to functional activities. 4. Back Education and Training for body mechanics with activities of daily living  5. If needed, aquatic therapy. GOALS: (Goals have been discussed and agreed upon with patient.)   Short-Term Functional Goals: Time Frame: 4 weeks  1. Report no more than minimal, intermittent 3/10 pain with standing and walking 15 min. MET 9/3/21  2. Demonstrates good posture with standing, working at computer, driving and sleeping. MET 9/3/21  3. JL hip ABD and IR strength is 5/5 for improved stability with static and dynamic balance, walking, and progressing into strengthening phase. PROGRESSED 10/1/21  4. Independent with initial level HEP. MET 9/3/21  Discharge Goals: Time Frame: 8 weeks   1. No significant pain in back with all normalized daily home and work activities, and less than 3/50 on Oswestry. ONGOING 10/1/21  2. Able to walk, stand at least 30 minutes with min to no pain in back. MET 10/1/21  3. Able to balance with good control in single-leg stand greater than 15 seconds with eyes open, greater than 3 seconds with eyes closed for improved dynamic stability. ONGOING 10/1/21  4. Demonstrates good use of core strategies with functional activities (squat, push-pull, lift). ONGOING 10/1/21  5. Independent with HEP for advanced hip strengthening. ONGOING 10/1/21    OUTCOME MEASURE:   Tool Used: Modified Oswestry Low Back Pain Questionnaire  Score:  Initial: 6/50 9/50 (Date: 9/3/21)    Most Recent:10/50 (10/8/21)    Interpretation of Score: Each section is scored on a 0-5 scale, 5 representing the greatest disability. The scores of each section are added together for a total score of 50.       MEDICAL NECESSITY:   · Patient is expected to demonstrate progress in strength, functional technique and endurance to increase independence with functional activities, walking without stressing inappropriately stressing back. .  REASON FOR SERVICES/OTHER COMMENTS:  · Patient continues to require skilled intervention due to weakness in core/hips, poor postural awareness and decreased functional abilities, need for guided rehab. .  Total Duration:       Rehabilitation Potential For Stated Goals: Excellent  Regarding Maddy Galeas's therapy, I certify that the treatment plan above will be carried out by a therapist or under their direction. Thank you for this referral,    Derrick Perla, PT                   PAIN/SUBJECTIVE:   Initial:   0/10 currently. Post Session:  0/10   HISTORY:      Ambulatory/Rehab Services H2 Model Falls Risk Assessment   Risk Factors:       No Risk Factors Identified Ability to Rise from Chair:       (1)  Pushes up, successful in one attempt   Falls Prevention Plan:       No modifications necessary   Total: (5 or greater = High Risk): 1   ©2010 [facility] of [employer]. All Rights Reserved. [facility] [employer] Patent #[id number]. Federal Law prohibits the replication, distribution or use without written permission from [facility] [employer]   Current Medications:     Current Outpatient Medications:     B infantis/B ani/B juli/B bifid (PROBIOTIC 4X PO), Take  by mouth daily. , Disp: , Rfl:     DISABLED PLACARD (DISABLED PLACARD) DM, Supply prescription to [facility] with completed form [id number]., Disp: , Rfl:     biotin (VITAMIN B7) 5 mg tablet, Take 5 mg by mouth daily. , Disp: , Rfl:     fluticasone propionate (Flonase Allergy Relief) 50 mcg/actuation nasal spray, 2 Sprays by Both Nostrils route as needed. , Disp: , Rfl:     ubidecarenone/vitamin E mixed (COQ10  PO), Take 100 mg by mouth daily. , Disp: , Rfl:     dextran 70-hypromellose (Artificial Tears,[id number],) ophthalmic solution, Administer 2 Drops to both eyes as needed. , Disp: , Rfl:     celecoxib (CELEBREX) 100 mg capsule, TAKE 1 CAPSULE DAILY, Disp: , Rfl:     cholecalciferol (VITAMIN D3) (1000 Units /25 mcg) tablet, Take 1,000 Units by mouth daily. , Disp: , Rfl:     colchicine 0.6 mg tablet, Take 0.6 mg by mouth two (2) times a day. 1/2 pill in the a.m. and 1/2 pill in the p.m., Disp: , Rfl:     gabapentin (NEURONTIN) 100 mg capsule, Take 100 mg by mouth two (2) times a day., Disp: , Rfl:     pravastatin (PRAVACHOL) 40 mg tablet, Take 40 mg by mouth every evening., Disp: , Rfl:     zinc 50 mg tab tablet, Take 50 mg by mouth daily. , Disp: , Rfl:     ethyl alcohol-herbal drugs elix, Take  by mouth two (2) times a day. Juice plus, Disp: , Rfl:     aspirin delayed-release 81 mg tablet, Take  by mouth daily. Preventative, Disp: , Rfl:    Date Last Reviewed:  10/8/21   EXAMINATION:     Observation/Orthostatic Postural Assessment: incisional scar is well healed in back and the side of her L hip  LQ Standing alignment: JL knee valgus R >L with pronated feet. Spinal alignment: Stands with posterior shifted trunk, increased thoracic kyphosis and forward head posture but she has learned to improve her standing posture   Vertebral Compression Test = 3/5 with current postural corrections  Palpation: tender to R greater  trochanter         Strength:  HIP Right hip  Left hip   Flexion 4 4   Extension 4- 4+   Abduction 4 4   External rotation 5 5   Internal rotation 5 5        Special Tests:   Hip Clearing Test: negative    Lumbar Spine Clearing Tests: weak to core flexion, Lumbar Protective Mechanism for trunk flexion = 2/5; vertebral compression test = 3/5  Muscle Length Tests: tight JL lower back muscles and posterior neck  Joint Accessory Mobility testing: not assessed today  Neurological Screen: [place] neuro screen is intact for myotomes. See muscle testing above. 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   Neg SLR or prone knee bend tension tests.   Functional Mobility:  Independent and not too slow with Sit to/from Stand, Bed Mobility, Walking on level ground, and Car Transfer :  Balance: single leg stance on level ground, eyes open = 12-15 sec but with some decrease in core stability

## 2021-10-08 NOTE — PROGRESS NOTES
Alycia Bone  : 1945  [place] / Product Type: Medicare /  [address]  at [facility]  1101 [facility], [address], [address] [facility]  [phone]   [phone]       OUTPATIENT PHYSICAL THERAPY: Daily Treatment Note 10/8/2021  Visit Count: 15  ICD-10: Treatment Diagnosis: M62.81 generalized weakness  Precautions/Allergies:   Latex, natural rubber; Diphenhydramine hcl; Penicillins; Hydromorphone (bulk); and Povidone-iodine   TREATMENT PLAN:  Effective Dates: 10/1/21 to 21 (60 days). Frequency/Duration: 2 times a week for 60 Day(s) MEDICAL/REFERRING DIAGNOSIS:  back pain   DATE OF ONSET: SUrgery 2021  REFERRING PHYSICIAN: Dilcia Padilla MD Orders: eval and treat  Return MD Appointment: 2021              Pre-treatment Symptoms/Complaints  Doing pretty good  Pain: Initial:   1-210 in low back; Post Session:  010    Medications Last Reviewed:  10/1 /2021 no changes  Updated Objective Findings: see progress note   TREATMENT:   Manual (0 min)  not assessed today  Therapetic  Activity (8 min)  -- standing and sitting posture practice with feed back  Therapeutic Exercises:( 32 Min)  MR -= manual resistance  -- hook lie hip flexion march x , then hold one LE at 90/90 with hand and uni hip flexion 10x 2 ea side  -- sitting chin tuck 10 sec x 10  -- open stance facing wall to hip flexion with opposite arm going from flexion to neutral 10x ea side  -- isotonics for prone hip extension to exhaustion with MR- Herbert sides ~ 4-7x ea side  -- step up 4\" with R LE and LE hip flexion 10x2 ea side with addition of opposite arm elevation as able   HEP: to continue updated HEP as practiced today. [employer] Portal  Treatment/Session Summary:    · Response to Treatment:  R hip extension pattern and core stability still has poor timing and is weak compared to L. Will continue focus on this.   Pt needs slow progressions because of memory issues. · Communication/Consultation:  None today  · Equipment provided today:  None today  Recommendations/Intent for next treatment session: Next visit will cotinue focus on reviewing HEP with updates as able. Needs continued progression for core and R LE posterior chain.    Total Treatment Billable Duration:  40 min  PT Patient Time In/Time Out  Time In: 8389  Time Out: [address] [facility], PT    Future Appointments   Date Time Provider Annabella Barrett   10/15/2021  8:30 AM Paticia Hearing, PT [facility]   10/19/2021  3:15 PM Paticia Hearing, PT [facility]   10/22/2021  8:30 AM Paticia Hearing, PT [facility]   10/26/2021  2:00 PM Paticia Hearing, PT [facility]   10/29/2021  8:30 AM Paticia Hearing, PT [facility]   11/2/2021  2:30 PM Paticia Hearing, PT [facility]   12/9/2021  8:45 AM MD ARELY Rudolph

## 2021-10-08 NOTE — PROGRESS NOTES
Urban Brunner  : 1945  [place] / Product Type: Medicare /  [address] at [facility]  1101 [facility], [address] [place] [address],[address], [address] [facility]  [phone]   [phone]       OUTPATIENT PHYSICAL THERAPY:Recertification    Please back date to 10/1/21   ICD-10: Treatment Diagnosis: M62.81 generalized weakness  Precautions/Allergies:   Latex, natural rubber; Diphenhydramine hcl; Penicillins; Hydromorphone (bulk); and Povidone-iodine   TREATMENT PLAN:  Effective Dates:10/1/21 to 21 (60 days). Frequency/Duration: 2 times a week for 60 Day(s)  Pt started PT on  and has completed 14 visits. MEDICAL/REFERRING DIAGNOSIS:  back pain   DATE OF ONSET: SUrgery 2021  REFERRING PHYSICIAN: Otis Momin*  MD Orders: eval and treat  Return MD Appointment: 2021      ASSESSMENT for 10/1/21:  Urban Brunner is s/p L4/5 fusion on 21. She has made progress with standing, walking abilities and states that usually does not have with current activities but does struggle with standing longer periods and sitting more than 30 minutes. She presented with very weak core stabilizers and this has progressed but is still fairly weak to core muscle stabilizers and she fatigues easily. Her  postural awareness has improved as well but we continue to work on how this relates to her core strategies with functional activities. We had started decreasing her visits to 1 time weekly, but realize that because of poor memory retention, she will need to return to 2x week when able. She will benefit from continue PT for guided rehab to promote normalized return of motion, strength, and function in order for safe return to home chores, standing and walking. PROBLEM LIST (Impacting functional limitations):  1. Pain in back with some functional activities  2. Poor core endurance  3.  Decreased functional strength JL hip/LE 4. Decreased gait endurance and balance skills INTERVENTIONS PLANNED:  1. Modalities PRN, including ultrasound, estim, and iontophoresis  2. Soft tissue and joint mobilization for ROM and flexibility  3. Stretching, progressive resistive exercises and HEP for return to functional activities. 4. Back Education and Training for body mechanics with activities of daily living  5. If needed, aquatic therapy. GOALS: (Goals have been discussed and agreed upon with patient.)   Short-Term Functional Goals: Time Frame: 4 weeks  1. Report no more than minimal, intermittent 3/10 pain with standing and walking 15 min. MET 9/3/21  2. Demonstrates good posture with standing, working at computer, driving and sleeping. MET 9/3/21  3. JL hip ABD and IR strength is 5/5 for improved stability with static and dynamic balance, walking, and progressing into strengthening phase. PROGRESSED 10/1/21  4. Independent with initial level HEP. MET 9/3/21  Discharge Goals: Time Frame: 8 weeks   1. No significant pain in back with all normalized daily home and work activities, and less than 3/50 on Oswestry. ONGOING 10/1/21  2. Able to walk, stand at least 30 minutes with min to no pain in back. MET 10/1/21  3. Able to balance with good control in single-leg stand greater than 15 seconds with eyes open, greater than 3 seconds with eyes closed for improved dynamic stability. ONGOING 10/1/21  4. Demonstrates good use of core strategies with functional activities (squat, push-pull, lift). ONGOING 10/1/21  5. Independent with HEP for advanced hip strengthening. ONGOING 10/1/21    OUTCOME MEASURE:   Tool Used: Modified Oswestry Low Back Pain Questionnaire  Score:  Initial: 6/50 9/50 (Date: 9/3/21)    Most Recent:10/50 (10/8/21)    Interpretation of Score: Each section is scored on a 0-5 scale, 5 representing the greatest disability. The scores of each section are added together for a total score of 50.       MEDICAL NECESSITY:   · Patient is expected to demonstrate progress in strength, functional technique and endurance to increase independence with functional activities, walking without stressing inappropriately stressing back. .  REASON FOR SERVICES/OTHER COMMENTS:  · Patient continues to require skilled intervention due to weakness in core/hips, poor postural awareness and decreased functional abilities, need for guided rehab. .  Total Duration:       Rehabilitation Potential For Stated Goals: Excellent  Regarding Mee Galeas's therapy, I certify that the treatment plan above will be carried out by a therapist or under their direction. Thank you for this referral,    Paulina Han, PT                   PAIN/SUBJECTIVE:   Initial:   0/10 currently. Post Session:  0/10   HISTORY:      Ambulatory/Rehab Services H2 Model Falls Risk Assessment   Risk Factors:       No Risk Factors Identified Ability to Rise from Chair:       (1)  Pushes up, successful in one attempt   Falls Prevention Plan:       No modifications necessary   Total: (5 or greater = High Risk): 1   ©2010 [facility] of [employer]. All Rights Reserved. [facility] [employer] Patent #[id number]. Federal Law prohibits the replication, distribution or use without written permission from [facility] [employer]   Current Medications:     Current Outpatient Medications:     B infantis/B ani/B juli/B bifid (PROBIOTIC 4X PO), Take  by mouth daily. , Disp: , Rfl:     DISABLED PLACARD (DISABLED PLACARD) DM, Supply prescription to [facility] with completed form RG-007A., Disp: , Rfl:     biotin (VITAMIN B7) 5 mg tablet, Take 5 mg by mouth daily. , Disp: , Rfl:     fluticasone propionate (Flonase Allergy Relief) 50 mcg/actuation nasal spray, 2 Sprays by Both Nostrils route as needed. , Disp: , Rfl:     ubidecarenone/vitamin E mixed (COQ10  PO), Take 100 mg by mouth daily. , Disp: , Rfl:     dextran 70-hypromellose (Artificial Tears,[id number],) ophthalmic solution, Administer 2 Drops to both eyes as needed. , Disp: , Rfl:     celecoxib (CELEBREX) 100 mg capsule, TAKE 1 CAPSULE DAILY, Disp: , Rfl:     cholecalciferol (VITAMIN D3) (1000 Units /25 mcg) tablet, Take 1,000 Units by mouth daily. , Disp: , Rfl:     colchicine 0.6 mg tablet, Take 0.6 mg by mouth two (2) times a day. 1/2 pill in the a.m. and 1/2 pill in the p.m., Disp: , Rfl:     gabapentin (NEURONTIN) 100 mg capsule, Take 100 mg by mouth two (2) times a day., Disp: , Rfl:     pravastatin (PRAVACHOL) 40 mg tablet, Take 40 mg by mouth every evening., Disp: , Rfl:     zinc 50 mg tab tablet, Take 50 mg by mouth daily. , Disp: , Rfl:     ethyl alcohol-herbal drugs elix, Take  by mouth two (2) times a day. Juice plus, Disp: , Rfl:     aspirin delayed-release 81 mg tablet, Take  by mouth daily. Preventative, Disp: , Rfl:    Date Last Reviewed:  10/8/21   EXAMINATION:     Observation/Orthostatic Postural Assessment: incisional scar is well healed in back and the side of her L hip  LQ Standing alignment: JL knee valgus R >L with pronated feet. Spinal alignment: Stands with posterior shifted trunk, increased thoracic kyphosis and forward head posture but she has learned to improve her standing posture   Vertebral Compression Test = 3/5 with current postural corrections  Palpation: tender to R greater  trochanter         Strength:  HIP Right hip  Left hip   Flexion 4 4   Extension 4- 4+   Abduction 4 4   External rotation 5 5   Internal rotation 5 5        Special Tests:   Hip Clearing Test: negative    Lumbar Spine Clearing Tests: weak to core flexion, Lumbar Protective Mechanism for trunk flexion = 2/5; vertebral compression test = 3/5  Muscle Length Tests: tight JL lower back muscles and posterior neck  Joint Accessory Mobility testing: not assessed today  Neurological Screen: [place] neuro screen is intact for myotomes. See muscle testing above. 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   Neg SLR or prone knee bend tension tests.   Functional Mobility:  Independent and not too slow with Sit to/from Stand, Bed Mobility, Walking on level ground, and Car Transfer :  Balance: single leg stance on level ground, eyes open = 12-15 sec but with some decrease in core stability

## 2021-10-15 ENCOUNTER — HOSPITAL ENCOUNTER (OUTPATIENT)
Dept: PHYSICAL THERAPY | Age: 76
Discharge: HOME | End: 2021-10-15
Payer: MEDICARE

## 2021-10-15 PROCEDURE — 97530 THERAPEUTIC ACTIVITIES: CPT

## 2021-10-15 PROCEDURE — 97110 THERAPEUTIC EXERCISES: CPT

## 2021-10-15 NOTE — PROGRESS NOTES
Marla Plummer  : 1945  [place] / Product Type: Medicare /  [address] at [facility]  [address] [facility], [address], [place] [address].  [phone]   [phone]       OUTPATIENT PHYSICAL THERAPY: Daily Treatment Note 10/15/2021  Visit Count: 16  ICD-10: Treatment Diagnosis: M62.81 generalized weakness  Precautions/Allergies:   Latex, natural rubber; Diphenhydramine hcl; Penicillins; Hydromorphone (bulk); and Povidone-iodine   TREATMENT PLAN:  Effective Dates: 10/1/21 to 21 (60 days). Frequency/Duration: 2 times a week for 60 Day(s) MEDICAL/REFERRING DIAGNOSIS:  back pain   DATE OF ONSET: SUrgery 2021  REFERRING PHYSICIAN: Eric Ceja MD Orders: eval and treat  Return MD Appointment: 2021              Pre-treatment Symptoms/Complaints  Did too much work with helping out on an event on Friday night (included bending over to cut cloth) and was in much pain that evening through next day. Pain: Initial:   2/10 in low back;   Post Session:  No VAS - no reports of increased pain   Medications Last Reviewed:  10/15 /2021 no changes  Updated Objective Findings: see progress note   TREATMENT:   Manual (0 min)  not assessed today  Therapetic  Activity (11 min)  -- standing open stance practice with push/pull motion feed back  Therapeutic Exercises:( 32 Min)  MR -= manual resistance  -- swiss ball: LTR 15 ea way, bridge with LE straight and SAQ 2x10, opp arm to LE with SLR/ on LE on ball 2x10 ea, press JL hip and hands into ball in hook lye 10 sec x 5  -- hook lie hip flexion with hold one LE at > 90/90 with hand and uni hip flexion 8x 2 ea side  -- isotonics for prone hip extension to exhaustion with MR- Jl sides ~ 5 sec x 5 x ea side  -- sitting chin tuck 10 sec x 10  -- open stance facing wall to hip flexion with opposite arm going from flexion to neutral 8x ea side  -- mini lunge to open stance position (with psoas stretch) and opp arm elevation 10x ea side  -- step up 4\" not today  HEP: to continue updated HEP as practiced today. [employer]  Treatment/Session Summary:    · Response to Treatment:  R hip extension pattern improving and was able to assign next level of hook lye marching next exercise=  core stability is improving from last week. · Communication/Consultation:  None today  · Equipment provided today:  None today  Recommendations/Intent for next treatment session: Next visit will cotinue focus on reviewing HEP with updates as able. Continued progression for core and R LE posterior chain.    Total Treatment Billable Duration:  43 min  PT Patient Time In/Time Out  Time In: 0830  Time Out: ROBERTO Kirkpatrick    Future Appointments   Date Time Provider Annabella Barrett   10/19/2021  3:15 PM Sherl Stager, PT [facility]   10/22/2021  8:30 AM Sherl Stager, PT [facility]   10/26/2021  2:00 PM Sherl Stager, PT [facility]   10/29/2021  8:30 AM Sherl Stager, PT [facility]   11/2/2021  2:30 PM Sherl Stager, PT [facility]   11/4/2021  1:45 PM Sherl Stager, PT [facility]   12/9/2021  8:45 AM MD ARELY Yanes

## 2021-10-19 ENCOUNTER — HOSPITAL ENCOUNTER (OUTPATIENT)
Dept: PHYSICAL THERAPY | Age: 76
Discharge: HOME | End: 2021-10-19
Payer: MEDICARE

## 2021-10-19 PROCEDURE — 97110 THERAPEUTIC EXERCISES: CPT

## 2021-10-19 NOTE — PROGRESS NOTES
Salinas Alana  : 1945  [place] / Product Type: Medicare /  [address]  at Count includes the [facility] [address]EN[address] OVIEDO  [address], [address], [place] [address].  [phone]   [phone]       OUTPATIENT PHYSICAL THERAPY: Daily Treatment Note 10/19/2021  Visit Count: 17  ICD-10: Treatment Diagnosis: M62.81 generalized weakness  Precautions/Allergies:   Latex, natural rubber; Diphenhydramine hcl; Penicillins; Hydromorphone (bulk); and Povidone-iodine   TREATMENT PLAN:  Effective Dates: 10/1/21 to 21 (60 days). 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 Activities-- walking, getting up from chair, up/down stairs. Pain: Initial:   10 in low back;   Post Session:  No VAS - no reports of increased pain   Medications Last Reviewed:  10/15 /2021 no changes  Updated Objective Findings:    TREATMENT:   Manual (0 min)  not assessed today  Therapetic  Activity (0 min)  Not today  Therapeutic Exercises:( 40 Min)  MR -= manual resistance  -- swiss ball: LTR 15 ea way, bridge with LE straight and SAQ 2x10, opp arm to LE with SLR/ on LE on ball 2x10 ea, press JL hip and hands into ball in hook lye 10 sec x 5  -- hook lie hip flexion with hold one LE at > 90/90 with hand and uni hip flexion 10 x ea side; then at 90/90 10x ea side  -- quadriped hip extension x 10 ea  -- isotonics for prone hip extension to exhaustion with MR- Jl sides ~ 10 sec x 3   -- stand bent over hip ext 2# with opp shoulder elevation 5 sec hold 10x ea side  -- open stance facing wall to hip flexion with opposite arm going from flexion to neutral - next  -- mini lunge with 1 LE on slider, facing wall 10x ea side  -- step up 6\" to hip flexion with UE flexion 10x ea side  -- standing JL shoulder EXT green band 10x2  HEP: to continue updated HEP as practiced today. [employer] Portal  Treatment/Session Summary:    · Response to Treatment:  Able to upgrade hip flexion core exercises to next level today. · Communication/Consultation:  None today  · Equipment provided today:  None today  Recommendations/Intent for next treatment session: Next visit will cotinue focus on reviewing HEP with updates as able. Continued progression for core and R LE posterior chain- start working towards discharge level HEP.    Total Treatment Billable Duration:  40 min  PT Patient Time In/Time Out  Time In: 0402  Time Out: [address], PT    Future Appointments   Date Time Provider Annabella Barrett   10/22/2021  8:30 AM Jilda Sin, PT [facility]   10/26/2021  2:00 PM Jilda Sin, PT [facility]   10/29/2021  8:30 AM Jilda Sin, PT [facility]   11/2/2021  2:30 PM Jilda Sin, PT [facility]   11/4/2021  1:45 PM Jilda Sin, PT [facility]   12/9/2021  8:45 AM MD ARELY Pham

## 2021-10-22 ENCOUNTER — HOSPITAL ENCOUNTER (OUTPATIENT)
Dept: PHYSICAL THERAPY | Age: 76
Discharge: HOME | End: 2021-10-22
Payer: MEDICARE

## 2021-10-22 PROCEDURE — 97110 THERAPEUTIC EXERCISES: CPT

## 2021-10-22 NOTE — PROGRESS NOTES
Eliazar Meza  : 1945  [place] / Product Type: Medicare /  [address] at [facility] [address]ENRY OVIEDO  [facility] [facility], [address], [place] [address].  [phone]   [phone]       OUTPATIENT PHYSICAL THERAPY: Daily Treatment Note 10/22/2021  Visit Count: 18  ICD-10: Treatment Diagnosis: M62.81 generalized weakness  Precautions/Allergies:   Latex, natural rubber; Diphenhydramine hcl; Penicillins; Hydromorphone (bulk); and Povidone-iodine   TREATMENT PLAN:  Effective Dates: 10/1/21 to 21 (60 days). Frequency/Duration: 2 times a week for 60 Day(s) MEDICAL/REFERRING DIAGNOSIS:  back pain   DATE OF ONSET: SUrgery 2021  REFERRING PHYSICIAN: General Rodríguez*  MD Orders: eval and treat  Return MD Appointment: 2021              Pre-treatment Symptoms/Complaints     Pain: Initial:   0-2/10 in low back;   Post Session:  No VAS - no reports of increased pain   Medications Last Reviewed:  10/15 /2021 no changes  Updated Objective Findings: none today   TREATMENT:   Manual (0 min)  not assessed today  Therapetic  Activity (0 min)  Not today  Therapeutic Exercises:( 40 Min) to improve core strength and endurance with functional activities  MR -= manual resistance  -- Shuttle: #50x 10, #62.5 x 10, #75 2x 10  -- swiss ball: LTR x10 ea way then 2# 10x ea way, bridge with LE straight and SAQ 2x10, opp arm to LE with SLR with 2#/ on LE on ball 2x10 ea,   press JL hip and hands into ball in hook lye 10 sec x 3 and then marching with LE and hand press into ball x 5 ea side  -- hook lie hip flexion with hold one LE at 90/90 with hand and uni hip flexion 10 x ea side; then with no hand hold on knee 5x ea side  -- stand bent over hip ext 2# with opp shoulder elevation 3 sec hold 10x ea side  -- open stance facing wall to hip flexion with opposite arm going from flexion to neutral - 10x ea side  -- balance work with weight shift with 1 arm elevation then + head turn then + JL arm elevation ~ 15x ea side  -- mini lunge with 1 LE on slider, facing wall 10x ea side  -- step up 6\" to hip flexion with UE flexion 10x ea side  -- standing JL shoulder EXT green band 10x2  HEP: to continue updated HEP. [facility] Portal  Treatment/Session Summary:    · Response to Treatment:  Exhibited good balance with wt shift and head turning- had more difficulty when using JL arms, but overall showing improved trunk control. Recall with many exercises improving. · Communication/Consultation:  None today  · Equipment provided today:  None today  Recommendations/Intent for next treatment session: Next visit will cotinue focus on reviewing HEP with updates as able. Continued progression for core and R LE posterior chain- start working towards discharge level HEP.    Total Treatment Billable Duration:  40 min  PT Patient Time In/Time Out  Time In: 2970  Time Out: [address] [facility],     Future Appointments   Date Time Provider Annabella Barrett   10/26/2021  2:00 PM Sherl Stager, [place] [facility]   10/29/2021  8:30 AM Sherl Stager, PT [facility]   11/2/2021  2:30 PM Sherl Stager, PT [facility]   11/4/2021  1:45 PM Sherl Stager, PT [facility]   11/9/2021  3:30 PM Sherl Stager, PT [facility]   11/12/2021  8:30 AM Sherl Stager, PT [facility]   12/9/2021  8:45 AM MD ARELY Yanes

## 2021-10-26 ENCOUNTER — HOSPITAL ENCOUNTER (OUTPATIENT)
Dept: PHYSICAL THERAPY | Age: 76
Discharge: HOME | End: 2021-10-26
Payer: MEDICARE

## 2021-10-26 PROCEDURE — 97110 THERAPEUTIC EXERCISES: CPT

## 2021-10-26 NOTE — PROGRESS NOTES
Salinas Alana  : 1945  [place] / Product Type: Medicare /  [address] at [facility] [address]ENRY OVIEDO  [address], [address], [place] [address].  [phone]   [phone]       OUTPATIENT PHYSICAL THERAPY: Daily Treatment Note 10/26/2021  Visit Count: 19  ICD-10: Treatment Diagnosis: M62.81 generalized weakness  Precautions/Allergies:   Latex, natural rubber; Diphenhydramine hcl; Penicillins; Hydromorphone (bulk); and Povidone-iodine   TREATMENT PLAN:  Effective Dates: 10/1/21 to 21 (60 days). Frequency/Duration: 2 times a week for 60 Day(s) MEDICAL/REFERRING DIAGNOSIS:  back pain   DATE OF ONSET: SUrgery 2021  REFERRING PHYSICIAN: Vinay Aparicio  MD Orders: eval and treat  Return MD Appointment: 2021              Pre-treatment Symptoms/Complaints     Pain: Initial:   0-10 in low back;   Post Session:  No VAS - no reports of increased pain   Medications Last Reviewed:  10/26 /2021 added D mannose  Updated Objective Findings: R LE still weak compared to L   TREATMENT:   Manual (0 min)  not assessed today  Therapetic  Activity (0 min)  Not today  Therapeutic Exercises:( 36 Min) to improve core strength and endurance with functional activities  MR -= manual resistance  -- Shuttle: #62.5 -- not today  -- swiss ball: LTR x10 ea way then 2# 10x ea way, bridge with LE straight and SAQ 2x10 with 2#, opp arm to LE with SLR with 2#/ on LE on ball 2x10 ea,   press BILhands into ball in hook lye  then marching with LE 20 marches with 2# weights  -- supine SLR 10x ea side  -- hook lie hip flexion with hold one LE at 90/90 with hand and uni hip flexion 10 x ea side  -- stand bent over hip ext 2# with opp shoulder elevation 3 sec hold 10x ea side  -- open stance facing wall to hip flexion with opposite arm going from flexion to neutral - 10x ea side  -- mini lunge with 1 LE on slider, facing wall 10x ea side  -- step up 6\" to hip flexion with UE flexion 10x ea side  -- standing JL shoulder EXT green band 10x2  -- marchiing on foam facing wall x 20  HEP: to continue updated HEP. [employer] Portal  Treatment/Session Summary:    · Response to Treatment: core timing on R side with weight shift mini lunge slide still needs work. · Communication/Consultation:  None today  · Equipment provided today:  None today  Recommendations/Intent for next treatment session: Next visit will cotinue focus on reviewing HEP with updates as able. Continued progression for core and R LE posterior chain- start working towards discharge level HEP.    Total Treatment Billable Duration:  40 min  PT Patient Time In/Time Out  Time In: 1410  Time Out: [address], PT    Future Appointments   Date Time Provider Annabella Barrett   10/29/2021  8:30 AM Dinah Conine, PT [facility]   11/2/2021  2:30 PM Dinah Conine, PT [facility]   11/4/2021  1:45 PM Dinah Conine, PT [facility]   11/9/2021  3:30 PM Dinah Conine, PT [facility]   11/12/2021  8:30 AM Dinah Conine, PT [facility]   12/9/2021  8:45 AM MD ARELY Pantoja

## 2021-10-29 ENCOUNTER — HOSPITAL ENCOUNTER (OUTPATIENT)
Dept: PHYSICAL THERAPY | Age: 76
Discharge: HOME | End: 2021-10-29
Payer: MEDICARE

## 2021-10-29 PROCEDURE — 97110 THERAPEUTIC EXERCISES: CPT

## 2021-10-29 NOTE — PROGRESS NOTES
Phong Santa  : 1945  [place] / Product Type: Medicare /  [address] at [facility] [address]ENRY OVIEDO  [address], [address], [place] [address].  [phone]   [phone]       OUTPATIENT PHYSICAL THERAPY: Daily Treatment Note 10/29/2021  Visit Count: 20  ICD-10: Treatment Diagnosis: M62.81 generalized weakness  Precautions/Allergies:   Latex, natural rubber; Diphenhydramine hcl; Penicillins; Hydromorphone (bulk); and Povidone-iodine   TREATMENT PLAN:  Effective Dates: 10/1/21 to 21 (60 days). Frequency/Duration: 2 times a week for 60 Day(s) MEDICAL/REFERRING DIAGNOSIS:  back pain   DATE OF ONSET: SUrgery 2021  REFERRING PHYSICIAN: Lisa Strickland*  MD Orders: eval and treat  Return MD Appointment: 2021              Pre-treatment Symptoms/Complaints   No pain today. Pain: Initial:   0-1/10 in low back;   Post Session:  No VAS - no reports of increased pain   Medications Last Reviewed:  10/26 /2021 added D mannose  Updated Objective Findings: on 10/26/21 R LE still weak compared to L   TREATMENT:   Manual (0 min)  not assessed today  Therapetic  Activity (0 min)  Not today  Therapeutic Exercises:( 36 Min) to improve core strength and endurance with functional activities  MR -= manual resistance  -- Shuttle: #62.5 -- not today  -- swiss ball: LTR x10 ea way then 2# 10x ea way, bridge with LE straight [id number] SAQ x10 with 2#, opp arm to LE with SLR with 2#/ on LE on ball 2x10 ea,   press BILhands into ball in hook lye with marching with LE 20 marches with 2# weights  -- supine SLR 10x 2 ea side  -- hook lie hip flexion with hold one LE at 90/90 with hand and uni hip flexion 10 x 2  ea side  -- stand bent over hip ext 2# with opp shoulder elevation 3 sec hold 10x2 ea side  -- open stance facing wall to hip flexion with opposite arm going from flexion to neutral 2# - 10x ea side  -- mini lunge with 1 LE on slider, facing wall 10x ea side  -- step up 6\" to hip flexion with UE flexion -- not today  -- standing JL shoulder EXT green band-- not today  -- marchiing on foam facing wall x 20  HEP: pt to focus on swiss ball or supine exercises as 1st HEP and then do standing exercises as 2nd HEP- can do them separately  [employer] Portal  Treatment/Session Summary:    · Response to Treatment: good return demonstration. Working on pt recalling routine well in supine today. · Communication/Consultation:  None today  · Equipment provided today:  None today  Recommendations/Intent for next treatment session: Next visit will cotinue focus on reviewing HEP with focus on recalling standing HEP.    Total Treatment Billable Duration:  40 min  PT Patient Time In/Time Out  Time In: 9213  Time Out: [address], PT    Future Appointments   Date Time Provider Annabella Barrett   11/2/2021  2:30 PM [place], PT [facility]   11/4/2021  1:45 PM [place] Jump, PT [facility]   11/9/2021  3:30 PM [place], PT [facility]   11/12/2021  8:30 AM [place] Jump, PT [facility]   12/9/2021  8:45 AM MD ARELY Willoughby

## 2021-11-02 ENCOUNTER — HOSPITAL ENCOUNTER (OUTPATIENT)
Dept: PHYSICAL THERAPY | Age: 76
Discharge: HOME | End: 2021-11-02
Payer: MEDICARE

## 2021-11-02 PROCEDURE — 97110 THERAPEUTIC EXERCISES: CPT

## 2021-11-02 NOTE — PROGRESS NOTES
Cyndie Dewey  : 1945  [place] / Product Type: Medicare /  [address]  at [facility] [address]EN[address]VIED[address]  [address] [place] [address], [address], [place] [address].  [phone]   [phone]       OUTPATIENT PHYSICAL THERAPY:Progress Report 2021    ICD-10: Treatment Diagnosis: M62.81 generalized weakness  Precautions/Allergies:   Latex, natural rubber; Diphenhydramine hcl; Penicillins; Hydromorphone (bulk); and Povidone-iodine   TREATMENT PLAN:  Effective Dates:10/1/21 to 21 (60 days). Frequency/Duration: 2 times a week for 60 Day(s)  Pt started PT on  and has completed 21 visits. MEDICAL/REFERRING DIAGNOSIS:  back pain   DATE OF ONSET: SUrgery 2021  REFERRING PHYSICIAN: Maida Torres  MD Orders: eval and treat  Return MD Appointment: 2021      ASSESSMENT for 21:  Cyndie Dewey is s/p L4/5 fusion on 21. 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 Her  postural awareness has improved as well but we continue to work on how this relates to her core strategies with functional activities. We are focusing on core strengthening with attention to functional activities as well. She is doing better with memory retention for exercises. She will benefit from a few more visits to work on discharge level HEP with increases in exercises as able. PROBLEM LIST (Impacting functional limitations):  1. Pain in back with some functional activities  2. Poor core endurance  3. Decreased functional strength JL hip/LE   4. Decreased gait endurance and balance skills INTERVENTIONS PLANNED:  1. Modalities PRN, including ultrasound, estim, and iontophoresis  2. Soft tissue and joint mobilization for ROM and flexibility  3.  Stretching, progressive resistive exercises and HEP for return to functional activities. 4. Back Education and Training for body mechanics with activities of daily living  5. If needed, aquatic therapy. GOALS: (Goals have been discussed and agreed upon with patient.)   Short-Term Functional Goals: Time Frame: 4 weeks  1. Report no more than minimal, intermittent 3/10 pain with standing and walking 15 min. MET 9/3/21  2. Demonstrates good posture with standing, working at computer, driving and sleeping. MET 9/3/21  3. JL hip ABD and IR strength is 5/5 for improved stability with static and dynamic balance, walking, and progressing into strengthening phase. PROGRESSED 10/1/21  4. Independent with initial level HEP. MET 9/3/21  Discharge Goals: Time Frame: 8 weeks   1. No significant pain in back with all normalized daily home and work activities, and less than 3/50 on Oswestry. ONGOING 10/1/21  2. Able to walk, stand at least 30 minutes with min to no pain in back. MET 10/1/21  3. Able to balance with good control in single-leg stand greater than 15 seconds with eyes open, greater than 3 seconds with eyes closed for improved dynamic stability. ONGOING 10/1/21  4. Demonstrates good use of core strategies with functional activities (squat, push-pull, lift). ONGOING 10/1/21  5. Independent with HEP for advanced hip strengthening. ONGOING 10/1/21    OUTCOME MEASURE:   Tool Used: Modified Oswestry Low Back Pain Questionnaire  Score:  Initial: 6/50 9/50 (Date: 9/3/21)    :10/50 (10/8/21) Most Recent: 4/50 (11/2/21)   Interpretation of Score: Each section is scored on a 0-5 scale, 5 representing the greatest disability. The scores of each section are added together for a total score of 50. MEDICAL NECESSITY:   · Patient is expected to demonstrate progress in strength, functional technique and endurance to increase independence with functional activities, walking without stressing inappropriately stressing back. .  REASON FOR SERVICES/OTHER COMMENTS:  · Patient continues to require skilled intervention due to weakness in core/hips, poor postural awareness and decreased functional abilities, need for guided rehab. .  Total Duration:  PT Patient Time In/Time Out  Time In: 1435  Time Out: 1520    Rehabilitation Potential For Stated Goals: Excellent  Regarding Trini Galeas's therapy, I certify that the treatment plan above will be carried out by a therapist or under their direction. Thank you for this referral,    Viri Hernandez, PT                   PAIN/SUBJECTIVE:   Initial:   0/10 currently. Post Session:  0/10   HISTORY:      Ambulatory/Rehab Services H2 Model Falls Risk Assessment   Risk Factors:       No Risk Factors Identified Ability to Rise from Chair:       (1)  Pushes up, successful in one attempt   Falls Prevention Plan:       No modifications necessary   Total: (5 or greater = High Risk): 1   ©2010 [facility] of [employer]. All Rights Reserved. [facility] Patent #[id number]. Federal Law prohibits the replication, distribution or use without written permission from [facility] [employer]   Current Medications:     Current Outpatient Medications:     B infantis/B ani/B juli/B bifid (PROBIOTIC 4X PO), Take  by mouth daily. , Disp: , Rfl:     DISABLED PLACARD (DISABLED PLACARD) DMV, Supply prescription to [facility] with completed form [id number]., Disp: , Rfl:     biotin (VITAMIN B7) 5 mg tablet, Take 5 mg by mouth daily. , Disp: , Rfl:     fluticasone propionate (Flonase Allergy Relief) 50 mcg/actuation nasal spray, 2 Sprays by Both Nostrils route as needed. , Disp: , Rfl:     ubidecarenone/vitamin E mixed (COQ10  PO), Take 100 mg by mouth daily. , Disp: , Rfl:     dextran 70-hypromellose (Artificial Tears,[id number],) ophthalmic solution, Administer 2 Drops to both eyes as needed. , Disp: , Rfl:     celecoxib (CELEBREX) 100 mg capsule, TAKE 1 CAPSULE DAILY, Disp: , Rfl:     cholecalciferol (VITAMIN D3) (1000 Units /25 mcg) tablet, Take 1,000 Units by mouth daily. , Disp: , Rfl:     colchicine 0.6 mg tablet, Take 0.6 mg by mouth two (2) times a day. 1/2 pill in the a.m. and 1/2 pill in the p.m., Disp: , Rfl:     gabapentin (NEURONTIN) 100 mg capsule, Take 100 mg by mouth two (2) times a day., Disp: , Rfl:     pravastatin (PRAVACHOL) 40 mg tablet, Take 40 mg by mouth every evening., Disp: , Rfl:     zinc 50 mg tab tablet, Take 50 mg by mouth daily. , Disp: , Rfl:     ethyl alcohol-herbal drugs elix, Take  by mouth two (2) times a day. Juice plus, Disp: , Rfl:     aspirin delayed-release 81 mg tablet, Take  by mouth daily. Preventative, Disp: , Rfl:    Date Last Reviewed:  10/8/21   EXAMINATION:     Observation/Orthostatic Postural Assessment: incisional scar is well healed in back and the side of her L hip  LQ Standing alignment: JL knee valgus R >L with pronated feet. Spinal alignment: Stands with posterior shifted trunk, increased thoracic kyphosis and forward head posture but she has learned to improve her standing posture   Vertebral Compression Test = 3/5 with current postural corrections  Palpation: tender to R greater  trochanter         Strength:  HIP Right hip  Left hip   Flexion 4 4   Extension 4- 4+   Abduction 4 4   External rotation 5 5   Internal rotation 5 5        Special Tests:   Hip Clearing Test: negative    Lumbar Spine Clearing Tests: weak to core flexion, Lumbar Protective Mechanism for trunk flexion = 2/5; vertebral compression test = 3/5  Muscle Length Tests: tight JL lower back muscles and posterior neck  Joint Accessory Mobility testing: not assessed today  Neurological Screen: [place] neuro screen is intact for myotomes. See muscle testing above. 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 Neg SLR or prone knee bend tension tests.    Functional Mobility:  Independent and not too slow with Sit to/from Stand, Bed Mobility, Walking on level ground, and Car Transfer :  Balance: single leg stance on level ground, eyes open = 12-15 sec but with some decrease in core stability

## 2021-11-02 NOTE — PROGRESS NOTES
Eliazar Meza  : 1945  [place] / Product Type: Medicare /  [address]  at [facility] HENRY OVIEDO  [address], [address], [place] [address].  [phone]   [phone]       OUTPATIENT PHYSICAL THERAPY: Daily Treatment Note 2021  Visit Count: 21  ICD-10: Treatment Diagnosis: M62.81 generalized weakness  Precautions/Allergies:   Latex, natural rubber; Diphenhydramine hcl; Penicillins; Hydromorphone (bulk); and Povidone-iodine   TREATMENT PLAN:  Effective Dates: 10/1/21 to 21 (60 days). 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 No back pain. Pain: Initial:   0/10 in low back;   Post Session:  0/10   Medications Last Reviewed:  2021 added D mannose  Updated Objective Findings: on 10/26/21 R LE still weak compared to L   TREATMENT:   Manual (0 min)  not assessed today  Therapetic  Activity (0 min)  Not today  Therapeutic Exercises:( 36 Min) to improve core strength and endurance with functional activities  MR -= manual resistance  -- Shuttle: #62.5 -- not today  -- swiss ball: LTR x20 ea way, SAQ x10 with 2# 2x10 (arms crossed), bridge with LE straight 2x10(arms crossed), opp arm to LE with SLR with 2#/ on LE on ball 2x10 ea,   press BILhands into ball in hook lye with marching with LE 21 marches with 2# weights  -- supine SLR 10x 2 ea side  -- hook lie hip flexion with hold one LE at 90/90 with hand and uni hip flexion 10 x 2  ea side  -- side lye lumbar distracation with hip posterior depression 10x ea side  STANDING  -- open stance facing wall to hip flexion with opposite arm going from flexion to neutral  - 10x ea side  -- stand bent over hip ext 2# with opp shoulder elevation 3 sec hold 10x2 ea side  -- mini lunge with 1 LE on slider, facing wall 10x ea side  -- reverse sliders 10x ea  -- step up 6\" to hip flexion -- 10x ea  -- marching on foam facing wall -- not today  HEP: pt to focus on swiss ball or supine exercises as 1st HEP and then do standing exercises as 2nd HEP- can do them separately  [facility] Portal  Treatment/Session Summary:    · Response to Treatment: pt was tired today and tired easily with exercises,  But had improved use of core especially with cue to tuck chin as needed. · Communication/Consultation:  None today  · Equipment provided today:  None today  Recommendations/Intent for next treatment session: Next visit will cotinue focus on reviewing HEP for supine and standing. Add choppers with theraband for cross core work and review back training for posture as needed.    Total Treatment Billable Duration:  40 min  PT Patient Time In/Time Out  Time In: 1435  Time Out: [place] [id number], PT    Future Appointments   Date Time Provider Annabella Barrett   11/4/2021  1:45 PM [facility]   11/9/2021  3:30 PM Alfredo Zuluaga, PT [facility]   11/12/2021  8:30 AM [facility]   12/9/2021  8:45 AM MD ARELY Lauren

## 2021-11-04 ENCOUNTER — APPOINTMENT (OUTPATIENT)
Dept: PHYSICAL THERAPY | Age: 76
End: 2021-11-04
Payer: MEDICARE

## 2021-11-09 ENCOUNTER — HOSPITAL ENCOUNTER (OUTPATIENT)
Dept: PHYSICAL THERAPY | Age: 76
Discharge: HOME | End: 2021-11-09
Payer: MEDICARE

## 2021-11-09 PROCEDURE — 97110 THERAPEUTIC EXERCISES: CPT

## 2021-11-09 NOTE — PROGRESS NOTES
Catrina Dunaway  : 1945  [place] / Product Type: Medicare /  [address]  at [facility] HENRY OVIEDO  [address] [facility] [address], [address], [place] [address].  [phone]   [phone]       OUTPATIENT PHYSICAL THERAPY: Daily Treatment Note 2021  Visit Count: 22  ICD-10: Treatment Diagnosis: M62.81 generalized weakness  Precautions/Allergies:   Latex, natural rubber; Diphenhydramine hcl; Penicillins; Hydromorphone (bulk); and Povidone-iodine   TREATMENT PLAN:  Effective Dates: 10/1/21 to 21 (60 days). Frequency/Duration: 2 times a week for 60 Day(s) MEDICAL/REFERRING DIAGNOSIS:  back pain   DATE OF ONSET: SUrgery 2021  REFERRING PHYSICIAN: Abhilash Santiago MD Orders: eval and treat  Return MD Appointment: 2021              Pre-treatment Symptoms/Complaints   States she did food bank and monthly massage prior to today's session. She states that she can tell is stronger. Pain: Initial:   0/10 in low back;   Post Session:  0/10   Medications Last Reviewed:  2021   Updated Objective Findings:not today   TREATMENT:   Manual (0 min)  not assessed today  Therapetic  Activity (0 min)  Not today  Therapeutic Exercises:( 36 Min) to improve core strength and endurance with functional activities  MR -= manual resistance  -- Shuttle: #62.5 -- not today  -- swiss ball: LTR x20 ea way, SAQ x10 with 3# 2x10 (arms crossed), bridge with LE straight 2x10(arms crossed),   -- supine SLR 3# with opp arm 10x 2 ea side   STANDING  -- step up 6\" to hip flexion -- 10x ea  -- marching on foam facing wall -- 20 x 2  -- single leg stance 2-3 trials on flat ground  -- D2 with green band 10x then 5x ea way  -- D1 with green band 10x ea way  HEP: pt to focus on swiss ball or supine exercises as 1st HEP and then do standing exercises as 2nd HEP- can do them separately  [facility] Portal  Treatment/Session Summary:    · Response to Treatment: did well with memorizing initial PT and good return demonstration with addition of D1/D2. Struggles still with single leg stance   on R LE on flat ground, eyes open. · Communication/Consultation:  None today  · Equipment provided today:  None today  Recommendations/Intent for next treatment session: Next visit will cotinue focus on reviewing HEP as needed and review back training for posture as needed.    Total Treatment Billable Duration:  40 min  PT Patient Time In/Time Out  Time In: 1442  Time Out: [address] [place] [address],     Future Appointments   Date Time Provider Annabella Ramosi   11/18/2021  3:15 PM [facility]   12/9/2021  8:45 AM MD ARELY Salas

## 2021-11-12 ENCOUNTER — APPOINTMENT (OUTPATIENT)
Dept: PHYSICAL THERAPY | Age: 76
End: 2021-11-12
Payer: MEDICARE

## 2021-11-18 ENCOUNTER — APPOINTMENT (OUTPATIENT)
Dept: PHYSICAL THERAPY | Age: 76
End: 2021-11-18
Payer: MEDICARE

## 2021-12-03 PROBLEM — I10 PRIMARY HYPERTENSION: Chronic | Status: ACTIVE | Noted: 2021-12-03

## 2021-12-03 PROBLEM — I25.10 CORONARY ARTERY DISEASE INVOLVING NATIVE CORONARY ARTERY OF NATIVE HEART WITHOUT ANGINA PECTORIS: Chronic | Status: ACTIVE | Noted: 2021-12-03

## 2021-12-03 PROBLEM — E78.5 DYSLIPIDEMIA: Chronic | Status: ACTIVE | Noted: 2021-12-03

## 2021-12-15 ENCOUNTER — HOSPITAL ENCOUNTER (OUTPATIENT)
Dept: CARDIAC REHAB | Age: 76
Discharge: HOME | End: 2021-12-15

## 2021-12-15 NOTE — ROUTINE PROCESS
Dear Dr. Lizette Bernal,    Thank you for referring your patient, Mrs. Brenda Be ( 1945), to the Cardiopulmonary Rehabilitation Program at [facility]. She is a good candidate for the Cardiac Rehab Program and should see improvements with regular participation. We will be addressing appropriate interventions for modifiable risk factors with your patient during the next 12 weeks. We will contact you with any issues or concerns that may arise, or you can follow your patient's progress through [address] [place] [address] at any time. A final summary will be sent to you when the program is completed. Again, thank you for the referral. If we can be of further assistance, please feel free to contact the Cardiopulmonary Rehab staff at [phone].     Sincerely,    PRAMOD George, RN  Cardiopulmonary Rehabilitation Nurse Liaison  [facility]

## 2022-01-04 ENCOUNTER — HOSPITAL ENCOUNTER (OUTPATIENT)
Dept: CARDIAC REHAB | Age: 77
Discharge: HOME | End: 2022-01-04
Payer: MEDICARE

## 2022-01-04 VITALS — HEIGHT: 63 IN | WEIGHT: 170.2 LBS | BODY MASS INDEX: 30.16 KG/M2

## 2022-01-04 PROCEDURE — 93798 PHYS/QHP OP CAR RHAB W/ECG: CPT

## 2022-01-05 ENCOUNTER — HOSPITAL ENCOUNTER (OUTPATIENT)
Dept: CARDIAC REHAB | Age: 77
Discharge: HOME | End: 2022-01-05
Payer: MEDICARE

## 2022-01-05 PROCEDURE — 93798 PHYS/QHP OP CAR RHAB W/ECG: CPT

## 2022-01-07 ENCOUNTER — HOSPITAL ENCOUNTER (OUTPATIENT)
Dept: CARDIAC REHAB | Age: 77
Discharge: HOME | End: 2022-01-07
Payer: MEDICARE

## 2022-01-07 PROCEDURE — 93798 PHYS/QHP OP CAR RHAB W/ECG: CPT

## 2022-01-10 ENCOUNTER — HOSPITAL ENCOUNTER (OUTPATIENT)
Dept: CARDIAC REHAB | Age: 77
Discharge: HOME | End: 2022-01-10
Payer: MEDICARE

## 2022-01-10 PROCEDURE — 93798 PHYS/QHP OP CAR RHAB W/ECG: CPT

## 2022-01-12 ENCOUNTER — HOSPITAL ENCOUNTER (OUTPATIENT)
Dept: CARDIAC REHAB | Age: 77
Discharge: HOME | End: 2022-01-12
Payer: MEDICARE

## 2022-01-12 VITALS — BODY MASS INDEX: 30.6 KG/M2 | WEIGHT: 170 LBS

## 2022-01-12 PROCEDURE — 93798 PHYS/QHP OP CAR RHAB W/ECG: CPT

## 2022-01-14 ENCOUNTER — HOSPITAL ENCOUNTER (OUTPATIENT)
Dept: CARDIAC REHAB | Age: 77
Discharge: HOME | End: 2022-01-14
Payer: MEDICARE

## 2022-01-14 PROCEDURE — 93798 PHYS/QHP OP CAR RHAB W/ECG: CPT

## 2022-01-19 ENCOUNTER — HOSPITAL ENCOUNTER (OUTPATIENT)
Dept: CARDIAC REHAB | Age: 77
Discharge: HOME | End: 2022-01-19
Payer: MEDICARE

## 2022-01-19 VITALS — BODY MASS INDEX: 30.6 KG/M2 | WEIGHT: 170 LBS

## 2022-01-19 PROCEDURE — 93798 PHYS/QHP OP CAR RHAB W/ECG: CPT

## 2022-01-21 ENCOUNTER — HOSPITAL ENCOUNTER (OUTPATIENT)
Dept: CARDIAC REHAB | Age: 77
Discharge: HOME | End: 2022-01-21
Payer: MEDICARE

## 2022-01-21 PROCEDURE — 93798 PHYS/QHP OP CAR RHAB W/ECG: CPT

## 2022-01-24 ENCOUNTER — HOSPITAL ENCOUNTER (OUTPATIENT)
Dept: CARDIAC REHAB | Age: 77
Discharge: HOME | End: 2022-01-24
Payer: MEDICARE

## 2022-01-24 PROCEDURE — 93798 PHYS/QHP OP CAR RHAB W/ECG: CPT

## 2022-01-26 ENCOUNTER — HOSPITAL ENCOUNTER (OUTPATIENT)
Dept: CARDIAC REHAB | Age: 77
Discharge: HOME | End: 2022-01-26
Payer: MEDICARE

## 2022-01-26 VITALS — BODY MASS INDEX: 31.57 KG/M2 | WEIGHT: 175.4 LBS

## 2022-01-26 PROCEDURE — 93798 PHYS/QHP OP CAR RHAB W/ECG: CPT

## 2022-01-28 ENCOUNTER — HOSPITAL ENCOUNTER (OUTPATIENT)
Dept: CARDIAC REHAB | Age: 77
Discharge: HOME | End: 2022-01-28
Payer: MEDICARE

## 2022-01-28 PROCEDURE — 93798 PHYS/QHP OP CAR RHAB W/ECG: CPT

## 2022-01-31 ENCOUNTER — HOSPITAL ENCOUNTER (OUTPATIENT)
Dept: CARDIAC REHAB | Age: 77
Discharge: HOME | End: 2022-01-31
Payer: MEDICARE

## 2022-01-31 PROCEDURE — 93798 PHYS/QHP OP CAR RHAB W/ECG: CPT

## 2022-02-02 ENCOUNTER — HOSPITAL ENCOUNTER (OUTPATIENT)
Dept: CARDIAC REHAB | Age: 77
Discharge: HOME | End: 2022-02-02
Payer: MEDICARE

## 2022-02-02 VITALS — WEIGHT: 172 LBS | BODY MASS INDEX: 30.96 KG/M2

## 2022-02-02 PROCEDURE — 93798 PHYS/QHP OP CAR RHAB W/ECG: CPT

## 2022-02-04 ENCOUNTER — HOSPITAL ENCOUNTER (OUTPATIENT)
Dept: CARDIAC REHAB | Age: 77
Discharge: HOME | End: 2022-02-04
Payer: MEDICARE

## 2022-02-04 PROCEDURE — 93798 PHYS/QHP OP CAR RHAB W/ECG: CPT

## 2022-02-07 ENCOUNTER — HOSPITAL ENCOUNTER (OUTPATIENT)
Dept: CARDIAC REHAB | Age: 77
Discharge: HOME | End: 2022-02-07
Payer: MEDICARE

## 2022-02-07 PROCEDURE — 93798 PHYS/QHP OP CAR RHAB W/ECG: CPT

## 2022-02-09 ENCOUNTER — HOSPITAL ENCOUNTER (OUTPATIENT)
Dept: CARDIAC REHAB | Age: 77
Discharge: HOME | End: 2022-02-09
Payer: MEDICARE

## 2022-02-09 VITALS — WEIGHT: 174.2 LBS | BODY MASS INDEX: 31.35 KG/M2

## 2022-02-09 PROCEDURE — 93798 PHYS/QHP OP CAR RHAB W/ECG: CPT

## 2022-02-11 ENCOUNTER — HOSPITAL ENCOUNTER (OUTPATIENT)
Dept: CARDIAC REHAB | Age: 77
Discharge: HOME | End: 2022-02-11
Payer: MEDICARE

## 2022-02-11 PROCEDURE — 93798 PHYS/QHP OP CAR RHAB W/ECG: CPT

## 2022-02-14 ENCOUNTER — HOSPITAL ENCOUNTER (OUTPATIENT)
Dept: CARDIAC REHAB | Age: 77
Discharge: HOME | End: 2022-02-14
Payer: MEDICARE

## 2022-02-14 VITALS — BODY MASS INDEX: 31.39 KG/M2 | WEIGHT: 174.4 LBS

## 2022-02-14 PROCEDURE — 93798 PHYS/QHP OP CAR RHAB W/ECG: CPT

## 2022-02-16 ENCOUNTER — HOSPITAL ENCOUNTER (OUTPATIENT)
Dept: CARDIAC REHAB | Age: 77
Discharge: HOME | End: 2022-02-16
Payer: MEDICARE

## 2022-02-16 VITALS — BODY MASS INDEX: 31.46 KG/M2 | WEIGHT: 174.8 LBS

## 2022-02-16 PROCEDURE — 93798 PHYS/QHP OP CAR RHAB W/ECG: CPT

## 2022-02-21 ENCOUNTER — HOSPITAL ENCOUNTER (OUTPATIENT)
Dept: CARDIAC REHAB | Age: 77
Discharge: HOME | End: 2022-02-21
Payer: MEDICARE

## 2022-02-21 PROCEDURE — 93798 PHYS/QHP OP CAR RHAB W/ECG: CPT

## 2022-02-23 ENCOUNTER — HOSPITAL ENCOUNTER (OUTPATIENT)
Dept: CARDIAC REHAB | Age: 77
Discharge: HOME | End: 2022-02-23
Payer: MEDICARE

## 2022-02-23 VITALS — WEIGHT: 171.4 LBS | BODY MASS INDEX: 30.85 KG/M2

## 2022-02-23 PROCEDURE — 93798 PHYS/QHP OP CAR RHAB W/ECG: CPT

## 2022-02-25 ENCOUNTER — HOSPITAL ENCOUNTER (OUTPATIENT)
Dept: CARDIAC REHAB | Age: 77
Discharge: HOME | End: 2022-02-25
Payer: MEDICARE

## 2022-02-25 PROCEDURE — 93798 PHYS/QHP OP CAR RHAB W/ECG: CPT

## 2022-02-28 ENCOUNTER — HOSPITAL ENCOUNTER (OUTPATIENT)
Dept: CARDIAC REHAB | Age: 77
Discharge: HOME | End: 2022-02-28
Payer: MEDICARE

## 2022-02-28 PROCEDURE — 93798 PHYS/QHP OP CAR RHAB W/ECG: CPT

## 2022-03-02 ENCOUNTER — HOSPITAL ENCOUNTER (OUTPATIENT)
Dept: CARDIAC REHAB | Age: 77
Discharge: HOME | End: 2022-03-02
Payer: MEDICARE

## 2022-03-02 VITALS — BODY MASS INDEX: 31.28 KG/M2 | WEIGHT: 173.8 LBS

## 2022-03-02 PROCEDURE — 93798 PHYS/QHP OP CAR RHAB W/ECG: CPT

## 2022-03-04 ENCOUNTER — HOSPITAL ENCOUNTER (OUTPATIENT)
Dept: CARDIAC REHAB | Age: 77
Discharge: HOME | End: 2022-03-04
Payer: MEDICARE

## 2022-03-04 PROCEDURE — 93798 PHYS/QHP OP CAR RHAB W/ECG: CPT

## 2022-03-07 ENCOUNTER — HOSPITAL ENCOUNTER (OUTPATIENT)
Dept: CARDIAC REHAB | Age: 77
Discharge: HOME | End: 2022-03-07
Payer: MEDICARE

## 2022-03-07 PROCEDURE — 93798 PHYS/QHP OP CAR RHAB W/ECG: CPT

## 2022-03-07 NOTE — PROGRESS NOTES
67-year-old female s/p NSTEMI enrolled in cardiac rehabilitation, seen today for initial nutrition counseling. Pt is very sweet, attended all nutrition services offered during CR. Currently on NutriSystem, started at the beginning of 2021, 20# weight loss (173#) obtained in August 2021. Has maintained 20# loss since, looking for further wt loss. Pt has voiced concern regarding blood sugar (mother hx), she will be getting labs done this month. Also noted CPAP strap is not fitting right (disrupts sleep). NUTRITION ASSESSMENT   Anthropometrics:   Ht: 5' 2.5\"  Wt: 173#  BMI: 31.28  BMI class of overweight based on age above 72  Waist measurement (inches): 37    Medical History: NSTEMI, PCI, fatty liver disease, CECIL w/ CPAP    Nutrition related medications/supplements:   Atorvastatin (LIPITOR) 80mg tablet   Metoprolol tartrate (LOPRESSOR) 25mg tablet     Nutrition Related Labs:   11/17/2021  Total Chol 185  TRIG 81  HDL 49    11/16/21  A1C 5.8  11/18/21  BG 87      Nutrition/Diet History:   *Biggest issue: sugar, says she cant have just a little bit. Once she starts eating these foods, feels like the days over. (all or nothing)     Food preferences:  -loves salmon, likes salads, most fruits, likes popcorn for a snack (recently tried [employer]; likes)  -doesn't love beans/legumes   -likes a small breakfast, doesn't like to eat after 7 (feels sluggish)     Stated 1 day diet recall includes   Breakfast: 7AM NutriSystem chocolate shake with banana and peanut butter  Lunch: 12-12:30 NutriSystem meal   Dinner: 5:30-6  NutriSystem meals  Beverages: 40-50oz water a day, Zevia drink some days.  -bought flavor packets to add to water & said it helps with intake    One day food recall appears inadequate in fruits, vegetables and fluid     NFPE: overall appearance WNL     Lifestyle, Culture,Support System: Pt is retired, lives with  Erin Jasso).  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 Physical Activity: Rehabilitation 3 days per week, off days pt stretches at home (for back) and completes 2-3 laps at [employer] on Thursdays. -a walk around her block takes 10 minutes, she would like to start incorporating this when the weather permits. Stage of Behavior Change: action      NUTRITION DIAGNOSIS  Imbalanced intake of fruits/vegetables related to food choices/food related knowledge evidenced by food recall and pt reports of diet history. NUTRITION INTERVENTION  Nutrition Prescription Recommendation:  Estimated Nutritional Needs:  Calories: 1598 calories MSJ x 1.3   Protein: 100g (25% of estimated energy needs)  CHO: 180g (45% of estimated energy needs)     Recommended Modifications of Meals, Snacks and Beverages:  o Include vegetables, fruits, whole grains, nuts/seeds, beans/legumes in all meals. o Less than 13 grams of saturated fat and avoid trans fat daily. o Include unsaturated fat sources (nuts, seeds, avocado). o Consuming fish 2 or more times weekly. o Daily-weekly consumption of dairy, poultry, and eggs  o Limit standard western diet foods include processed meat, processed carbohydrates and fried foods  o Water as primary beverage    2. Cardioprotective Nutrition Education:    Reviewed lab/body comp results/goals, and nutrition modifications that will support improvements in body composition and lab values.  Weight loss strategies reviewed, including sources of empty calories and portion sizes.  Educated patient on cardioprotective meal pattern/Mediterranean meal pattern including  o Guidelines for saturated fat (<7% total calories), sodium (<2000mg/day or follow MD recommendations), and added sugars (<25 grams for women) and high sources of each reviewed  o Consumption of daily fiber intake with emphasis on 7-13 grams of soluble fiber daily and food sources reviewed.   o Emphasis and sources of unsaturated fats and specific benefits of omega-3 fatty acids reviewed for cardioprotective benefits. o Emphasis on cardioprotective benefits of daily intake of plant-focused eating pattern. o Demonstrated food label reading   o Sodium education        3. Nutrition Counseling:    Utilized MI strategies to guide and support changes in food behaviors.  Offered suggestions to cope with stress      Handouts provided for home use:    Lifestyle guidelines for a healthy weight     How to get more vegetables   Seafood recipes    Nutrition Goals:   Pt goals:  Drink more water  11 0' clock bed time  don't keep ice cream in the house, go out & get it as a treat 1-2x a month    RD goals:  Continue to incorporate balanced meals   Increase water intake  Have a vegetable with lunch & dinner     NUTRITION MONITORING/EVALUATION  RD to follow up with participant during rehab sessions for questions and assessment of progression toward goals. Will meet with patient after labs are drawn this month to go over results. Anticipated Compliance: Great  Pt verbalizes understanding to recommendations discussed.      Barriers: None identified at this time     Eliezer Delaney, [address], LD  Cardiopulmonary Rehab Dietitian

## 2022-03-09 ENCOUNTER — HOSPITAL ENCOUNTER (OUTPATIENT)
Dept: CARDIAC REHAB | Age: 77
Discharge: HOME | End: 2022-03-09
Payer: MEDICARE

## 2022-03-09 VITALS — BODY MASS INDEX: 31.21 KG/M2 | WEIGHT: 173.4 LBS

## 2022-03-09 PROCEDURE — 93798 PHYS/QHP OP CAR RHAB W/ECG: CPT

## 2022-03-11 ENCOUNTER — APPOINTMENT (OUTPATIENT)
Dept: CARDIAC REHAB | Age: 77
End: 2022-03-11
Payer: MEDICARE

## 2022-03-14 ENCOUNTER — HOSPITAL ENCOUNTER (OUTPATIENT)
Dept: CARDIAC REHAB | Age: 77
Discharge: HOME | End: 2022-03-14
Payer: MEDICARE

## 2022-03-14 ENCOUNTER — APPOINTMENT (OUTPATIENT)
Dept: CARDIAC REHAB | Age: 77
End: 2022-03-14
Payer: MEDICARE

## 2022-03-14 PROCEDURE — 93798 PHYS/QHP OP CAR RHAB W/ECG: CPT

## 2022-03-16 ENCOUNTER — HOSPITAL ENCOUNTER (OUTPATIENT)
Dept: CARDIAC REHAB | Age: 77
Discharge: HOME | End: 2022-03-16
Payer: MEDICARE

## 2022-03-16 ENCOUNTER — APPOINTMENT (OUTPATIENT)
Dept: CARDIAC REHAB | Age: 77
End: 2022-03-16
Payer: MEDICARE

## 2022-03-16 VITALS — WEIGHT: 172.6 LBS | BODY MASS INDEX: 31.07 KG/M2

## 2022-03-16 PROCEDURE — 93798 PHYS/QHP OP CAR RHAB W/ECG: CPT

## 2022-03-17 ENCOUNTER — APPOINTMENT (OUTPATIENT)
Dept: CARDIAC REHAB | Age: 77
End: 2022-03-17
Payer: MEDICARE

## 2022-03-18 ENCOUNTER — HOSPITAL ENCOUNTER (OUTPATIENT)
Dept: CARDIAC REHAB | Age: 77
Discharge: HOME | End: 2022-03-18
Payer: MEDICARE

## 2022-03-18 ENCOUNTER — APPOINTMENT (OUTPATIENT)
Dept: CARDIAC REHAB | Age: 77
End: 2022-03-18
Payer: MEDICARE

## 2022-03-18 PROBLEM — I25.10 CORONARY ARTERY DISEASE INVOLVING NATIVE CORONARY ARTERY OF NATIVE HEART WITHOUT ANGINA PECTORIS: Status: ACTIVE | Noted: 2021-12-03

## 2022-03-18 PROCEDURE — 93798 PHYS/QHP OP CAR RHAB W/ECG: CPT

## 2022-03-19 PROBLEM — R29.818 NEUROGENIC CLAUDICATION: Status: ACTIVE | Noted: 2021-06-22

## 2022-03-19 PROBLEM — G95.19 NEUROGENIC CLAUDICATION (HCC): Status: ACTIVE | Noted: 2021-06-22

## 2022-03-19 PROBLEM — I10 PRIMARY HYPERTENSION: Status: ACTIVE | Noted: 2021-12-03

## 2022-03-19 PROBLEM — M43.16 SPONDYLOLISTHESIS OF LUMBAR REGION: Status: ACTIVE | Noted: 2021-06-22

## 2022-03-20 PROBLEM — E78.5 DYSLIPIDEMIA: Status: ACTIVE | Noted: 2021-12-03

## 2022-03-20 PROBLEM — Z98.1 STATUS POST LUMBAR SPINAL ARTHRODESIS: Status: ACTIVE | Noted: 2021-06-22

## 2022-03-21 ENCOUNTER — APPOINTMENT (OUTPATIENT)
Dept: CARDIAC REHAB | Age: 77
End: 2022-03-21
Payer: MEDICARE

## 2022-03-23 ENCOUNTER — APPOINTMENT (OUTPATIENT)
Dept: CARDIAC REHAB | Age: 77
End: 2022-03-23
Payer: MEDICARE

## 2022-03-23 ENCOUNTER — HOSPITAL ENCOUNTER (OUTPATIENT)
Dept: CARDIAC REHAB | Age: 77
Discharge: HOME | End: 2022-03-23
Payer: MEDICARE

## 2022-03-23 VITALS — BODY MASS INDEX: 31.07 KG/M2 | WEIGHT: 172.6 LBS

## 2022-03-23 PROCEDURE — 93798 PHYS/QHP OP CAR RHAB W/ECG: CPT

## 2022-03-25 ENCOUNTER — APPOINTMENT (OUTPATIENT)
Dept: CARDIAC REHAB | Age: 77
End: 2022-03-25
Payer: MEDICARE

## 2022-03-25 ENCOUNTER — HOSPITAL ENCOUNTER (OUTPATIENT)
Dept: CARDIAC REHAB | Age: 77
Discharge: HOME | End: 2022-03-25
Payer: MEDICARE

## 2022-03-25 PROCEDURE — 93798 PHYS/QHP OP CAR RHAB W/ECG: CPT

## 2022-03-28 ENCOUNTER — HOSPITAL ENCOUNTER (OUTPATIENT)
Dept: CARDIAC REHAB | Age: 77
Discharge: HOME | End: 2022-03-28
Payer: MEDICARE

## 2022-03-28 PROCEDURE — 93798 PHYS/QHP OP CAR RHAB W/ECG: CPT

## 2022-03-30 ENCOUNTER — HOSPITAL ENCOUNTER (OUTPATIENT)
Dept: CARDIAC REHAB | Age: 77
Discharge: HOME | End: 2022-03-30
Payer: MEDICARE

## 2022-03-30 VITALS — WEIGHT: 173.4 LBS | BODY MASS INDEX: 31.21 KG/M2

## 2022-03-30 PROCEDURE — 93798 PHYS/QHP OP CAR RHAB W/ECG: CPT

## 2022-04-01 ENCOUNTER — HOSPITAL ENCOUNTER (OUTPATIENT)
Dept: CARDIAC REHAB | Age: 77
Discharge: HOME | End: 2022-04-01
Payer: MEDICARE

## 2022-04-01 PROCEDURE — 93798 PHYS/QHP OP CAR RHAB W/ECG: CPT

## 2022-04-04 ENCOUNTER — HOSPITAL ENCOUNTER (OUTPATIENT)
Dept: CARDIAC REHAB | Age: 77
Discharge: HOME | End: 2022-04-04
Payer: MEDICARE

## 2022-04-04 PROCEDURE — 93798 PHYS/QHP OP CAR RHAB W/ECG: CPT

## 2022-04-05 NOTE — PROGRESS NOTES
Jamaica Alvarez  : 1945  [place] / Product Type: Medicare /  [address] at [facility] HENRY OVIEDO  [address] [facility], [address], [address] [facility]  [phone]   [phone]       OUTPATIENT PHYSICAL THERAPY:Discharge 2022    ICD-10: Treatment Diagnosis: M62.81 generalized weakness  Precautions/Allergies:   Latex, natural rubber; Diphenhydramine hcl; Penicillins; Hydromorphone (bulk); and Povidone-iodine   TREATMENT PLAN:  Effective Dates:10/1/21 to 21 (60 days). Frequency/Duration: 2 times a week for 60 Day(s)  Pt started PT on  and has completed 21 visits. MEDICAL/REFERRING DIAGNOSIS:  back pain   DATE OF ONSET: SUrgery 2021  REFERRING PHYSICIAN: Maria T Salinas*  MD Orders: eval and treat  Return MD Appointment: 2021      ASSESSMENT for 21:  Jamaica Alvarez is s/p L4/5 fusion on 21. 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 Her  postural awareness improved as well with improved awareness on how this relates to her core strategies with functional activities. She had to be hospitalized after her visit on 22 secondary to probable heart issues for this reason she had to discontinue PT. Most goals were met as noted below. GOALS: (Goals have been discussed and agreed upon with patient.)   Short-Term Functional Goals: Time Frame: 4 weeks  1. Report no more than minimal, intermittent 3/10 pain with standing and walking 15 min. MET 9/3/21  2. Demonstrates good posture with standing, working at computer, driving and sleeping. MET 9/3/21  3. JL hip ABD and IR strength is 5/5 for improved stability with static and dynamic balance, walking, and progressing into strengthening phase. PROGRESSED 10/1/21  4.  Independent with initial level HEP. MET 9/3/21  Discharge Goals: Time Frame: 8 weeks   1. No significant pain in back with all normalized daily home and work activities, and less than 3/50 on Oswestry. Progressed, almost met 11/2/22  2. Able to walk, stand at least 30 minutes with min to no pain in back. MET 10/1/21  3. Able to balance with good control in single-leg stand greater than 15 seconds with eyes open, greater than 3 seconds with eyes closed for improved dynamic stability. Progressed, almost met. 11/2/22  4. Demonstrates good use of core strategies with functional activities (squat, push-pull, lift). Progressed, almost met. 11/2/22  5. Independent with HEP for advanced hip strengthening. Progressed, almost met. Core strengthening as well. 11/2/22    OUTCOME MEASURE:   Tool Used: Modified Oswestry Low Back Pain Questionnaire  Score:  Initial: 6/50 9/50 (Date: 9/3/21)    :10/50 (10/8/21) Most Recent: 4/50 (11/2/21)   Interpretation of Score: Each section is scored on a 0-5 scale, 5 representing the greatest disability. The scores of each section are added together for a total score of 50. MEDICAL NECESSITY:   · Patient is expected to demonstrate progress in strength, functional technique and endurance to increase independence with functional activities, walking without stressing inappropriately stressing back. .  REASON FOR SERVICES/OTHER COMMENTS:  · Patient continues to require skilled intervention due to weakness in core/hips, poor postural awareness and decreased functional abilities, need for guided rehab. .  Total Duration:  PT Patient Time In/Time Out  Time In: 1535  Time Out: 1620    Rehabilitation Potential For Stated Goals: Excellent  Regarding Mee Galeas's therapy, I certify that the treatment plan above will be carried out by a therapist or under their direction. Thank you for this referral,    Paulina Han, PT                   PAIN/SUBJECTIVE:   Initial:   0/10 currently.   Post Session: 0/10   HISTORY:      Ambulatory/Rehab Services H2 Model Falls Risk Assessment   Risk Factors:       No Risk Factors Identified Ability to Rise from Chair:       (1)  Pushes up, successful in one attempt   Falls Prevention Plan:       No modifications necessary   Total: (5 or greater = High Risk): 1   ©2010 [facility] of [employer]. All Rights Reserved. [facility] [employer] Patent #[id number]. Federal Law prohibits the replication, distribution or use without written permission from [facility] Primary Data   Current Medications:     Current Outpatient Medications:     B infantis/B ani/B ujli/B bifid (PROBIOTIC 4X PO), Take  by mouth daily. , Disp: , Rfl:     DISABLED PLACARD (DISABLED PLACARD) DMV, Supply prescription to [facility] with completed form [id number]., Disp: , Rfl:     biotin (VITAMIN B7) 5 mg tablet, Take 5 mg by mouth daily. , Disp: , Rfl:     fluticasone propionate (Flonase Allergy Relief) 50 mcg/actuation nasal spray, 2 Sprays by Both Nostrils route as needed. , Disp: , Rfl:     ubidecarenone/vitamin E mixed (COQ10  PO), Take 100 mg by mouth daily. , Disp: , Rfl:     dextran 70-hypromellose (Artificial Tears,[id number],) ophthalmic solution, Administer 2 Drops to both eyes as needed. , Disp: , Rfl:     celecoxib (CELEBREX) 100 mg capsule, TAKE 1 CAPSULE DAILY, Disp: , Rfl:     cholecalciferol (VITAMIN D3) (1000 Units /25 mcg) tablet, Take 1,000 Units by mouth daily. , Disp: , Rfl:     colchicine 0.6 mg tablet, Take 0.6 mg by mouth two (2) times a day. 1/2 pill in the a.m. and 1/2 pill in the p.m., Disp: , Rfl:     gabapentin (NEURONTIN) 100 mg capsule, Take 100 mg by mouth two (2) times a day., Disp: , Rfl:     pravastatin (PRAVACHOL) 40 mg tablet, Take 40 mg by mouth every evening., Disp: , Rfl:     zinc 50 mg tab tablet, Take 50 mg by mouth daily. , Disp: , Rfl:     ethyl alcohol-herbal drugs elix, Take  by mouth two (2) times a day.  Juice plus, Disp: , Rfl:     aspirin delayed-release 81 mg tablet, Take  by mouth daily. Preventative, Disp: , Rfl:    Date Last Reviewed:  11/9/22   EXAMINATION:     Observation/Orthostatic Postural Assessment: incisional scar is well healed in back and the side of her L hip  LQ Standing alignment: JL knee valgus R >L with pronated feet. Spinal alignment: Stands with posterior shifted trunk, increased thoracic kyphosis and forward head posture but she has learned to improve her standing posture   Vertebral Compression Test = 3/5 with current postural corrections  Palpation: tender to R greater  trochanter         Strength:  HIP Right hip  Left hip   Flexion 4 4   Extension 4- 4+   Abduction 4 4   External rotation 5 5   Internal rotation 5 5        Special Tests:   Hip Clearing Test: negative    Lumbar Spine Clearing Tests: weak to core flexion, Lumbar Protective Mechanism for trunk flexion = 2/5; vertebral compression test = 3/5  Muscle Length Tests: tight JL lower back muscles and posterior neck  Joint Accessory Mobility testing: not assessed today  Neurological Screen: [place] neuro screen is intact for myotomes. See muscle testing above. 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 Neg SLR or prone knee bend tension tests.    Functional Mobility:  Independent and not too slow with Sit to/from Stand, Bed Mobility, Walking on level ground, and Car Transfer :  Balance: single leg stance on level ground, eyes open = 12-15 sec but with some decrease in core stability

## 2022-07-14 ENCOUNTER — OFFICE VISIT (OUTPATIENT)
Dept: ORTHOPEDIC SURGERY | Age: 77
End: 2022-07-14
Payer: MEDICARE

## 2022-07-14 DIAGNOSIS — Z98.1 STATUS POST LUMBAR SPINAL ARTHRODESIS: Primary | ICD-10-CM

## 2022-07-14 PROCEDURE — G8419 CALC BMI OUT NRM PARAM NOF/U: HCPCS | Performed by: ORTHOPAEDIC SURGERY

## 2022-07-14 PROCEDURE — 1036F TOBACCO NON-USER: CPT | Performed by: ORTHOPAEDIC SURGERY

## 2022-07-14 PROCEDURE — 1123F ACP DISCUSS/DSCN MKR DOCD: CPT | Performed by: ORTHOPAEDIC SURGERY

## 2022-07-14 PROCEDURE — G8427 DOCREV CUR MEDS BY ELIG CLIN: HCPCS | Performed by: ORTHOPAEDIC SURGERY

## 2022-07-14 PROCEDURE — G8400 PT W/DXA NO RESULTS DOC: HCPCS | Performed by: ORTHOPAEDIC SURGERY

## 2022-07-14 PROCEDURE — 1090F PRES/ABSN URINE INCON ASSESS: CPT | Performed by: ORTHOPAEDIC SURGERY

## 2022-07-14 PROCEDURE — 99213 OFFICE O/P EST LOW 20 MIN: CPT | Performed by: ORTHOPAEDIC SURGERY

## 2022-07-14 NOTE — PROGRESS NOTES
Name: Марина El  YOB: 1945  Gender: female  MRN: [id number]  Age: 67 y.o. Chief Complaint: Lumbar spine surgery follow up    History of Present Illness:      Марина El  is here for 1 year follow up of her  DLIF surgery. She reports significant relief of preoperative lower extremity pain, weakness and parasthesias. There is the expected residual back stiffness. Medications:       Current Outpatient Medications:     aspirin 81 MG EC tablet, Take by mouth daily, Disp: , Rfl:     atorvastatin (LIPITOR) 80 MG tablet, Take 80 mg by mouth daily, Disp: , Rfl:     Biotin 5 MG TABS, Take 5 mg by mouth daily, Disp: , Rfl:     vitamin D (CHOLECALCIFEROL) 25 MCG (1000 UT) TABS tablet, Take 1,000 Units by mouth daily, Disp: , Rfl:     colchicine (COLCRYS) 0.6 MG tablet, Take 0.6 mg by mouth 2 times daily, Disp: , Rfl:     dextran 70-hypromellose (TEARS NATURALE) 0.1-0.3 % SOLN opthalmic solution, Apply 2 drops to eye as needed, Disp: , Rfl:     fluticasone (FLONASE) 50 MCG/ACT nasal spray, 2 sprays by Nasal route as needed, Disp: , Rfl:     gabapentin (NEURONTIN) 100 MG capsule, Take 100 mg by mouth 2 times daily. , Disp: , Rfl:     magnesium oxide (MAG-OX) 400 (240 Mg) MG tablet, Take 400 mg by mouth daily, Disp: , Rfl:     metoprolol tartrate (LOPRESSOR) 25 MG tablet, Take 12.5 mg by mouth 2 times daily, Disp: , Rfl:     ticagrelor (BRILINTA) 90 MG TABS tablet, Take 90 mg by mouth 2 times daily, Disp: , Rfl:     Allergies:   Allergies   Allergen Reactions    Diphenhydramine Hives and Other (See Comments)    Penicillins Hives and Other (See Comments)    Hydromorphone Nausea And Vomiting    Povidone-Iodine Rash     Localized rash at site of use         Physical Exam:      Respirations are unlabored and there is no evidence of cyanosis      Wound is healing nicely without erythema, drainage or underlying fluctuance    Gait is normal    Sensory testing reveals intact sensation to light touch and in the distribution of the L3-S1 dermatomes bilaterally. Strength testing in the lower extremity reveals the following based on the 5 point grading scale:       HF (L2) H Ab (L5) KE (L3/4) ADF (L4) EHL (L5) A Ev (S1) APF (S1)   Right 5 5 5 5 5 5 5   Left 5 5 5 5 5 5 5        Radiographic Studies:     X-rays including AP and lateral views of the lumbar spine were reviewed and interpreted:     Postoperative changes are noted status post lumbar fusion with instrumentation. The hardware appears to be well-placed and intact. There is no evidence of significant osteolysis. No significant adjacent level decompensation. Alignment is maintained. Radiographic Impression:    Appropriate postoperative changes status post lumbar laminectomy and fusion without evidence for hardware failure or decompensation. Diagnosis:      ICD-10-CM    1. Status post lumbar spinal arthrodesis  Z98.1        Assessment/Plan: This patient is following the expected course following the spine surgery. She can continue activities as tolerated and return for follow up as needed.           Neisha Head MD    07/14/22  8:43 AM

## 2022-10-03 ENCOUNTER — TELEPHONE (OUTPATIENT)
Dept: CARDIOLOGY CLINIC | Age: 77
End: 2022-10-03

## 2022-10-03 NOTE — TELEPHONE ENCOUNTER
Patient's daughter in law Lani Hinton) has called again and said she needs to know when to restart patient's [facility] [place]. Patient was hospitalized last week with a GI bleed. Fransisco Aguilera is concerned and said she needs to know asap.  Fransisco Aguilera [phone]

## 2022-10-03 NOTE — TELEPHONE ENCOUNTER
Brilinta permanently. It has been almost a year since her stent was put in. Patient should continue to stay on aspirin 81 mg daily.

## 2022-10-03 NOTE — TELEPHONE ENCOUNTER
Was im [facility] with GI bleed and they took her off Brilinta . They need to ask about this .  Please call

## 2022-11-14 ENCOUNTER — OFFICE VISIT (OUTPATIENT)
Dept: CARDIOLOGY CLINIC | Age: 77
End: 2022-11-14
Payer: MEDICARE

## 2022-11-14 VITALS
SYSTOLIC BLOOD PRESSURE: 132 MMHG | HEIGHT: 63 IN | HEART RATE: 64 BPM | WEIGHT: 178 LBS | BODY MASS INDEX: 31.54 KG/M2 | DIASTOLIC BLOOD PRESSURE: 68 MMHG

## 2022-11-14 DIAGNOSIS — I10 PRIMARY HYPERTENSION: ICD-10-CM

## 2022-11-14 DIAGNOSIS — I25.10 CORONARY ARTERY DISEASE INVOLVING NATIVE CORONARY ARTERY OF NATIVE HEART WITHOUT ANGINA PECTORIS: Primary | ICD-10-CM

## 2022-11-14 DIAGNOSIS — E78.5 DYSLIPIDEMIA: ICD-10-CM

## 2022-11-14 DIAGNOSIS — K74.69 OTHER CIRRHOSIS OF LIVER (HCC): Chronic | ICD-10-CM

## 2022-11-14 PROCEDURE — G8428 CUR MEDS NOT DOCUMENT: HCPCS | Performed by: INTERNAL MEDICINE

## 2022-11-14 PROCEDURE — G8417 CALC BMI ABV UP PARAM F/U: HCPCS | Performed by: INTERNAL MEDICINE

## 2022-11-14 PROCEDURE — G8484 FLU IMMUNIZE NO ADMIN: HCPCS | Performed by: INTERNAL MEDICINE

## 2022-11-14 PROCEDURE — 3078F DIAST BP <80 MM HG: CPT | Performed by: INTERNAL MEDICINE

## 2022-11-14 PROCEDURE — 1123F ACP DISCUSS/DSCN MKR DOCD: CPT | Performed by: INTERNAL MEDICINE

## 2022-11-14 PROCEDURE — 99214 OFFICE O/P EST MOD 30 MIN: CPT | Performed by: INTERNAL MEDICINE

## 2022-11-14 PROCEDURE — 1036F TOBACCO NON-USER: CPT | Performed by: INTERNAL MEDICINE

## 2022-11-14 PROCEDURE — 3074F SYST BP LT 130 MM HG: CPT | Performed by: INTERNAL MEDICINE

## 2022-11-14 PROCEDURE — 1090F PRES/ABSN URINE INCON ASSESS: CPT | Performed by: INTERNAL MEDICINE

## 2022-11-14 PROCEDURE — G8400 PT W/DXA NO RESULTS DOC: HCPCS | Performed by: INTERNAL MEDICINE

## 2022-11-14 RX ORDER — CARVEDILOL 6.25 MG/1
6.25 TABLET ORAL 2 TIMES DAILY
COMMUNITY
Start: 2022-10-19

## 2022-11-14 RX ORDER — PANTOPRAZOLE SODIUM 40 MG/1
40 TABLET, DELAYED RELEASE ORAL 2 TIMES DAILY
COMMUNITY
Start: 2022-10-31 | End: 2022-11-30

## 2022-11-14 ASSESSMENT — ENCOUNTER SYMPTOMS
ABDOMINAL PAIN: 0
COUGH: 0
BACK PAIN: 0
SHORTNESS OF BREATH: 0

## 2022-11-14 NOTE — PROGRESS NOTES
[facility] CARDIOLOGY  [address], [address]      22      NAME:  Rabia Quispe  : 1945  MRN: [id number]      SUBJECTIVE:   Rabia Quispe is a 65 y.o. female seen for a follow up visit regarding the following:     Chief Complaint   Patient presents with    Coronary Artery Disease    Hypertension       HPI:    65 y.o. female with no prior cardiac history who presented with non-ST elevation myocardial infarction to hospital in [place]  2021. Patient had PCI and stenting of the left circumflex and LAD. LVEF was normal.  Patient's medical therapy is appropriate. Her activity has been limited by severe spinal stenosis which has been corrected recently. She currently denies any angina  or shortness of breath. She has some transient chest discomfort lasting only seconds and very atypical.  No exertional symptoms. Patient was admitted 10/2022 with upper GI bleeding. She was diagnosed with nonalcoholic fatty liver cirrhosis with associated portal hypertension and esophageal varices. Past Medical History, Past Surgical History, Family history, Social History, and Medications were all reviewed with the patient today and updated as necessary.      Current Outpatient Medications   Medication Sig Dispense Refill    pantoprazole (PROTONIX) 40 MG tablet Take 40 mg by mouth 2 times daily      carvedilol (COREG) 6.25 MG tablet Take 6.25 mg by mouth 2 times daily      aspirin 81 MG EC tablet Take by mouth daily      atorvastatin (LIPITOR) 80 MG tablet Take 80 mg by mouth daily      Biotin 5 MG TABS Take 5 mg by mouth daily      vitamin D (CHOLECALCIFEROL) 25 MCG (1000 UT) TABS tablet Take 1,000 Units by mouth daily      colchicine (COLCRYS) 0.6 MG tablet Take 0.6 mg by mouth 2 times daily      dextran 70-hypromellose (TEARS NATURALE) 0.1-0.3 % SOLN opthalmic solution Apply 2 drops to eye as needed      fluticasone (FLONASE) 50 MCG/ACT nasal spray 2 sprays by Nasal route as needed      gabapentin (NEURONTIN) 100 MG capsule Take 100 mg by mouth 2 times daily. magnesium oxide (MAG-OX) 400 (240 Mg) MG tablet Take 400 mg by mouth daily       No current facility-administered medications for this visit. Patient Active Problem List    Diagnosis Date Noted    Other cirrhosis of liver ([facility] 11/14/2022     Priority: High    Coronary artery disease involving native coronary artery of native heart without angina pectoris 12/03/2021     Priority: Low     11/2021:  [place], [place] - LCx 2.5x33, LAD 2.25x15 Xience        Primary hypertension 12/03/2021     Priority: Low    Dyslipidemia 12/03/2021     Priority: Low    Spondylolisthesis of lumbar region 06/22/2021     Priority: Low    Neurogenic claudication ([facility] 06/22/2021     Priority: Low    Status post lumbar spinal arthrodesis 06/22/2021     Priority: Low      Family History   Problem Relation Age of Onset    Stroke Mother     Stroke Father      Social History     Tobacco Use    Smoking status: Never    Smokeless tobacco: Never   Substance Use Topics    Alcohol use: Not Currently       Review Of Symptoms    Review of Systems   Constitutional: Negative for fever and malaise/fatigue. HENT:  Negative for nosebleeds. Cardiovascular:  Negative for chest pain, dyspnea on exertion and palpitations. Respiratory:  Negative for cough and shortness of breath. Musculoskeletal:  Negative for back pain, muscle cramps, muscle weakness and myalgias. Gastrointestinal:  Negative for abdominal pain. Neurological:  Negative for dizziness. Physical Exam  Blood pressure 132/68, pulse 64, height 5' 2.5\" (1.588 m), weight 178 lb (80.7 kg). Physical Exam  HENT:      Head: Normocephalic and atraumatic. Eyes:      Extraocular Movements: Extraocular movements intact. Cardiovascular:      Rate and Rhythm: Normal rate and regular rhythm. Heart sounds: No murmur heard.   Pulmonary:      Effort: Pulmonary effort is normal.      Breath sounds: Normal breath sounds. Abdominal:      Palpations: Abdomen is soft. Musculoskeletal:         General: Normal range of motion. Skin:     General: Skin is warm and dry. Neurological:      General: No focal deficit present. Mental Status: She is oriented to person, place, and time. Medical problems, medical history and test results were reviewed with the patient today. No results found for: CHOL  No results found for: TRIG  No results found for: HDL  No results found for: LDLCHOLESTEROL, LDLCALC  No results found for: LABVLDL, VLDL  No results found for: CHOLHDLRATIO     No results found for: LABA1C  No results found for: EAG     Lab Results   Component Value Date     06/16/2021    K 3.9 06/16/2021     (H) 06/16/2021    CO2 27 06/16/2021    BUN 16 06/16/2021    CREATININE 0.57 (L) 06/16/2021    GLUCOSE 98 06/16/2021    CALCIUM 10.1 06/16/2021    GFRAA >60 06/16/2021       Lab Results   Component Value Date/Time     06/16/2021 02:24 PM    K 3.9 06/16/2021 02:24 PM     06/16/2021 02:24 PM    CO2 27 06/16/2021 02:24 PM    BUN 16 06/16/2021 02:24 PM    CREATININE 0.57 06/16/2021 02:24 PM    GLUCOSE 98 06/16/2021 02:24 PM    CALCIUM 10.1 06/16/2021 02:24 PM        Lab Results   Component Value Date    WBC 4.5 06/16/2021    HGB 14.8 06/16/2021    HCT 44.6 06/16/2021    MCV 91.4 06/16/2021     (L) 06/16/2021             ASSESSMENT:     Diagnoses and all orders for this visit:      Coronary artery disease involving native coronary artery of native heart without angina pectoris -patient with non-ST elevation myocardial infarction 11/2021 in [place] with stenting of  the LAD and left circumflex. She is stable on aspirin. Blood pressure is well controlled she is on low-dose metoprolol tartrate and lipids are treated with high-dose atorvastatin. Primary hypertension -well controlled on metoprolol tartrate 12-1/2 mg twice daily. Dyslipidemia -patient was changed from pravastatin to atorvastatin 80 mg daily. Nonalcoholic fatty liver cirrhosis -patient was admitted 10/2022 to [facility] with upper GI bleed. She was found to have portal hypertension and severe esophageal varices. Problem List Items Addressed This Visit          Circulatory    Coronary artery disease involving native coronary artery of native heart without angina pectoris - Primary    Relevant Medications    carvedilol (COREG) 6.25 MG tablet    Other Relevant Orders    Transthoracic echocardiogram (TTE) complete with contrast, bubble, strain, and 3D PRN    Primary hypertension    Relevant Orders    Transthoracic echocardiogram (TTE) complete with contrast, bubble, strain, and 3D PRN       Digestive    Other cirrhosis of liver (HCC) (Chronic)       Other    Dyslipidemia       Medications Discontinued During This Encounter   Medication Reason    metoprolol tartrate (LOPRESSOR) 25 MG tablet              Patient has been instructed and agrees to call our office with any issues or other concerns related to their cardiac condition(s) and/or complaint(s). Return in about 6 months (around 5/14/2023).        Helga Davies MD  11/14/2022

## 2023-01-03 RX ORDER — ATORVASTATIN CALCIUM 80 MG/1
TABLET, FILM COATED ORAL
Qty: 90 TABLET | Refills: 3 | Status: SHIPPED | OUTPATIENT
Start: 2023-01-03

## 2023-05-18 ENCOUNTER — OFFICE VISIT (OUTPATIENT)
Age: 78
End: 2023-05-18

## 2023-05-18 VITALS
WEIGHT: 189 LBS | DIASTOLIC BLOOD PRESSURE: 78 MMHG | HEIGHT: 62 IN | SYSTOLIC BLOOD PRESSURE: 136 MMHG | BODY MASS INDEX: 34.78 KG/M2 | HEART RATE: 65 BPM

## 2023-05-18 DIAGNOSIS — K74.69 OTHER CIRRHOSIS OF LIVER (HCC): Chronic | ICD-10-CM

## 2023-05-18 DIAGNOSIS — E78.5 DYSLIPIDEMIA: ICD-10-CM

## 2023-05-18 DIAGNOSIS — I25.10 CORONARY ARTERY DISEASE INVOLVING NATIVE CORONARY ARTERY OF NATIVE HEART WITHOUT ANGINA PECTORIS: Primary | ICD-10-CM

## 2023-05-18 DIAGNOSIS — I10 PRIMARY HYPERTENSION: ICD-10-CM

## 2023-05-18 RX ORDER — TRIAMCINOLONE ACETONIDE 5 MG/G
CREAM TOPICAL
COMMUNITY
Start: 2023-03-01

## 2023-05-18 RX ORDER — PANTOPRAZOLE SODIUM 40 MG/1
TABLET, DELAYED RELEASE ORAL
COMMUNITY
Start: 2023-03-19

## 2023-05-18 ASSESSMENT — ENCOUNTER SYMPTOMS
ABDOMINAL PAIN: 0
BACK PAIN: 0
SHORTNESS OF BREATH: 0
COUGH: 0

## 2023-05-18 NOTE — PROGRESS NOTES
[facility] CARDIOLOGY  [address] [facility], [address]      23      NAME:  Daisy Price  : 1945  MRN: [id number]      SUBJECTIVE:   Daisy Price is a 65 y.o. female seen for a follow up visit regarding the following:     Chief Complaint   Patient presents with    6 Month Follow-Up       HPI:    65 y.o. female with no prior cardiac history who presented with non-ST elevation myocardial infarction to hospital in [place]  2021. Patient had PCI and stenting of the left circumflex and LAD. LVEF was normal.  Patient's medical therapy is appropriate. Her activity has been limited by severe spinal stenosis which has been corrected recently. She currently denies any angina  or shortness of breath. She has some transient chest discomfort lasting only seconds and very atypical.  No exertional symptoms. Patient was admitted 10/2022 with upper GI bleeding. She was diagnosed with nonalcoholic fatty liver cirrhosis with associated portal hypertension and esophageal varices. Past Medical History, Past Surgical History, Family history, Social History, and Medications were all reviewed with the patient today and updated as necessary.      Current Outpatient Medications   Medication Sig Dispense Refill    pantoprazole (PROTONIX) 40 MG tablet       triamcinolone (ARISTOCORT) 0.5 % cream       atorvastatin (LIPITOR) 80 MG tablet TAKE 1 TABLET DAILY 90 tablet 3    carvedilol (COREG) 6.25 MG tablet Take 1 tablet by mouth 2 times daily      aspirin 81 MG EC tablet Take by mouth daily      Biotin 5 MG TABS Take 1 tablet by mouth daily      vitamin D (CHOLECALCIFEROL) 25 MCG (1000 UT) TABS tablet Take 1 tablet by mouth daily      colchicine (COLCRYS) 0.6 MG tablet Take 1 tablet by mouth 2 times daily      dextran 70-hypromellose (TEARS NATURALE) 0.1-0.3 % SOLN opthalmic solution Apply 2 drops to eye as needed      fluticasone (FLONASE) 50 MCG/ACT nasal spray 2

## 2023-11-20 ENCOUNTER — OFFICE VISIT (OUTPATIENT)
Age: 78
End: 2023-11-20
Payer: MEDICARE

## 2023-11-20 VITALS
HEART RATE: 60 BPM | WEIGHT: 194 LBS | SYSTOLIC BLOOD PRESSURE: 120 MMHG | BODY MASS INDEX: 34.38 KG/M2 | DIASTOLIC BLOOD PRESSURE: 64 MMHG | HEIGHT: 63 IN

## 2023-11-20 DIAGNOSIS — K74.69 OTHER CIRRHOSIS OF LIVER (HCC): Chronic | ICD-10-CM

## 2023-11-20 DIAGNOSIS — E78.5 DYSLIPIDEMIA: ICD-10-CM

## 2023-11-20 DIAGNOSIS — I25.10 CORONARY ARTERY DISEASE INVOLVING NATIVE CORONARY ARTERY OF NATIVE HEART WITHOUT ANGINA PECTORIS: Primary | ICD-10-CM

## 2023-11-20 DIAGNOSIS — I10 PRIMARY HYPERTENSION: ICD-10-CM

## 2023-11-20 PROCEDURE — G8427 DOCREV CUR MEDS BY ELIG CLIN: HCPCS | Performed by: INTERNAL MEDICINE

## 2023-11-20 PROCEDURE — 1123F ACP DISCUSS/DSCN MKR DOCD: CPT | Performed by: INTERNAL MEDICINE

## 2023-11-20 PROCEDURE — 99214 OFFICE O/P EST MOD 30 MIN: CPT | Performed by: INTERNAL MEDICINE

## 2023-11-20 PROCEDURE — G8400 PT W/DXA NO RESULTS DOC: HCPCS | Performed by: INTERNAL MEDICINE

## 2023-11-20 PROCEDURE — 1036F TOBACCO NON-USER: CPT | Performed by: INTERNAL MEDICINE

## 2023-11-20 PROCEDURE — 3074F SYST BP LT 130 MM HG: CPT | Performed by: INTERNAL MEDICINE

## 2023-11-20 PROCEDURE — G8484 FLU IMMUNIZE NO ADMIN: HCPCS | Performed by: INTERNAL MEDICINE

## 2023-11-20 PROCEDURE — 1090F PRES/ABSN URINE INCON ASSESS: CPT | Performed by: INTERNAL MEDICINE

## 2023-11-20 PROCEDURE — 3078F DIAST BP <80 MM HG: CPT | Performed by: INTERNAL MEDICINE

## 2023-11-20 PROCEDURE — G8417 CALC BMI ABV UP PARAM F/U: HCPCS | Performed by: INTERNAL MEDICINE

## 2023-11-20 RX ORDER — FERROUS SULFATE 325(65) MG
325 TABLET ORAL
COMMUNITY

## 2023-11-20 RX ORDER — LANOLIN ALCOHOL/MO/W.PET/CERES
1000 CREAM (GRAM) TOPICAL DAILY
COMMUNITY

## 2023-11-20 ASSESSMENT — ENCOUNTER SYMPTOMS
SHORTNESS OF BREATH: 0
BACK PAIN: 0
ABDOMINAL PAIN: 0
COUGH: 0

## 2023-11-20 NOTE — PROGRESS NOTES
[facility] CARDIOLOGY  [address], [address]      23      NAME:  Wilber Mock  : 1945  MRN: [id number]      SUBJECTIVE:   Wilber Mock is a 77 y.o. female seen for a follow up visit regarding the following:     Chief Complaint   Patient presents with    Coronary Artery Disease       HPI:    77 y.o. female with no prior cardiac history who presented with non-ST elevation myocardial infarction to hospital in [place]  2021. Patient had PCI and stenting of the left circumflex and LAD. LVEF was normal.  Patient's medical therapy is appropriate. Her activity has been limited by severe spinal stenosis    Patient was admitted 10/2022 with upper GI bleeding. She was diagnosed with nonalcoholic fatty liver cirrhosis with associated portal hypertension and esophageal varices. Past Medical History, Past Surgical History, Family history, Social History, and Medications were all reviewed with the patient today and updated as necessary.      Current Outpatient Medications   Medication Sig Dispense Refill    vitamin B-12 (CYANOCOBALAMIN) 1000 MCG tablet Take 1 tablet by mouth daily      ferrous sulfate (IRON 325) 325 (65 Fe) MG tablet Take 1 tablet by mouth daily (with breakfast)      pantoprazole (PROTONIX) 40 MG tablet       triamcinolone (ARISTOCORT) 0.5 % cream       atorvastatin (LIPITOR) 80 MG tablet TAKE 1 TABLET DAILY 90 tablet 3    carvedilol (COREG) 6.25 MG tablet Take 1 tablet by mouth 2 times daily      aspirin 81 MG EC tablet Take by mouth daily      Biotin 5 MG TABS Take 1 tablet by mouth daily      vitamin D (CHOLECALCIFEROL) 25 MCG (1000 UT) TABS tablet Take 1 tablet by mouth daily      dextran 70-hypromellose (TEARS NATURALE) 0.1-0.3 % SOLN opthalmic solution Apply 2 drops to eye as needed      fluticasone (FLONASE) 50 MCG/ACT nasal spray 2 sprays by Nasal route as needed      gabapentin (NEURONTIN) 100 MG capsule Take 1 capsule by

## 2023-12-07 ENCOUNTER — OFFICE VISIT (OUTPATIENT)
Dept: ORTHOPEDIC SURGERY | Age: 78
End: 2023-12-07
Payer: MEDICARE

## 2023-12-07 VITALS — WEIGHT: 196.4 LBS | BODY MASS INDEX: 37.08 KG/M2 | HEIGHT: 61 IN

## 2023-12-07 DIAGNOSIS — M51.36 LUMBAR DEGENERATIVE DISC DISEASE: ICD-10-CM

## 2023-12-07 DIAGNOSIS — M54.50 LOW BACK PAIN, UNSPECIFIED BACK PAIN LATERALITY, UNSPECIFIED CHRONICITY, UNSPECIFIED WHETHER SCIATICA PRESENT: Primary | ICD-10-CM

## 2023-12-07 DIAGNOSIS — Z98.1 STATUS POST LUMBAR SPINAL ARTHRODESIS: ICD-10-CM

## 2023-12-07 DIAGNOSIS — M47.816 LUMBAR FACET ARTHROPATHY: ICD-10-CM

## 2023-12-07 PROCEDURE — G8428 CUR MEDS NOT DOCUMENT: HCPCS | Performed by: NURSE PRACTITIONER

## 2023-12-07 PROCEDURE — G8400 PT W/DXA NO RESULTS DOC: HCPCS | Performed by: NURSE PRACTITIONER

## 2023-12-07 PROCEDURE — 1090F PRES/ABSN URINE INCON ASSESS: CPT | Performed by: NURSE PRACTITIONER

## 2023-12-07 PROCEDURE — 1123F ACP DISCUSS/DSCN MKR DOCD: CPT | Performed by: NURSE PRACTITIONER

## 2023-12-07 PROCEDURE — 99213 OFFICE O/P EST LOW 20 MIN: CPT | Performed by: NURSE PRACTITIONER

## 2023-12-07 PROCEDURE — G8484 FLU IMMUNIZE NO ADMIN: HCPCS | Performed by: NURSE PRACTITIONER

## 2023-12-07 PROCEDURE — G8417 CALC BMI ABV UP PARAM F/U: HCPCS | Performed by: NURSE PRACTITIONER

## 2023-12-07 PROCEDURE — 1036F TOBACCO NON-USER: CPT | Performed by: NURSE PRACTITIONER

## 2023-12-07 NOTE — PROGRESS NOTES
Name: Rashmi Brumfield  YOB: 1945  Gender: female  MRN: [id number]    CC: Increasing back pain, right posterior leg pain    HPI: This is a 77y.o. year old female who had an L4-5 lumbar fusion with the left by Dr. Shahzad Aguirre in 2021. She did well however the past few months has developed increasing complaints of back pain. She feels like she cannot stand longer than 5 minutes without developing pain. She also get pain down the right posterior leg to her foot. She has concerns because she is getting ready to go on a trip to the [facility]. Pain is a 9 out of 10 with standing. She had on her preop MRI some degenerative disc disease and facet arthropathy at L5-S1. She denies bladder or bowel changes. She had a recent diagnosis of Holli Rhodes. This patient  has had lumbar surgery in the past.     Thus far, the patient has tried : Home exercises, surgery, previous injections   Current pain level: 9/10  Activities limited by pain: standing, twisting            12/7/2023     2:20 PM   AMB PAIN ASSESSMENT   Location of Pain Back   Frequency of Pain Intermittent   Limiting Behavior Yes   Result of Injury No   Work-Related Injury No   Are there other pain locations you wish to document? No            ROS/Meds/PSH/PMH/FH/SH: I personally reviewed the patient's collected intake data.   Below are the pertinents:    Allergies   Allergen Reactions    Latex      Other reaction(s): Rash-Allergy    Diphenhydramine Hives and Other (See Comments)    Penicillins Hives and Other (See Comments)    Hydromorphone Nausea And Vomiting    Povidone-Iodine Rash     Localized rash at site of use         Current Outpatient Medications:     vitamin B-12 (CYANOCOBALAMIN) 1000 MCG tablet, Take 1 tablet by mouth daily, Disp: , Rfl:     ferrous sulfate (IRON 325) 325 (65 Fe) MG tablet, Take 1 tablet by mouth daily (with breakfast), Disp: , Rfl:     pantoprazole (PROTONIX) 40 MG tablet, , Disp: , Rfl:     triamcinolone

## 2023-12-11 ENCOUNTER — PATIENT MESSAGE (OUTPATIENT)
Dept: ORTHOPEDIC SURGERY | Age: 78
End: 2023-12-11

## 2023-12-11 DIAGNOSIS — F40.240 CLAUSTROPHOBIA: Primary | ICD-10-CM

## 2023-12-12 RX ORDER — CLONAZEPAM 0.5 MG/1
0.25 TABLET ORAL ONCE
Qty: 1 TABLET | Refills: 0 | Status: SHIPPED | OUTPATIENT
Start: 2023-12-12 | End: 2023-12-12

## 2023-12-12 NOTE — TELEPHONE ENCOUNTER
From: Brayden Counter  To: Verner Almond  Sent: 12/11/2023 4:29 PM EST  Subject: Upcoming MRI    I have contacted my Gastroenterologist ( Dr Eder Mosqueda) about the Medrol Pack, 4 mm. I think I will not do this right now. It may be something in the future if needed. I think I have changed my mind on whether I want/need a med for the Dec 18th MRI! I am very claustrophobic and although I HOPE I can handle the close quarters, I do not want to jeopardize the appointment. If you could go ahead & send something to my [facility] location on record I would appreciate it! Please call me if any questions! Thank You.      Daniel Gandara  [phone]

## 2023-12-12 NOTE — TELEPHONE ENCOUNTER
Klonopin sent in 0.25 mg, may repeat dose if needed     Orders Placed This Encounter    clonazePAM (KLONOPIN) 0.5 MG tablet     Sig: Take 0.5 tablets by mouth once for 1 dose.  Take 1/2 tablet one hour prior to MRI, may repeat in 30 min if necessary Max Daily Amount: 0.25 mg     Dispense:  1 tablet     Refill:  0

## 2024-01-23 ENCOUNTER — OFFICE VISIT (OUTPATIENT)
Dept: ORTHOPEDIC SURGERY | Age: 79
End: 2024-01-23
Payer: MEDICARE

## 2024-01-23 DIAGNOSIS — Z98.1 STATUS POST LUMBAR SPINAL ARTHRODESIS: ICD-10-CM

## 2024-01-23 DIAGNOSIS — M51.36 LUMBAR DEGENERATIVE DISC DISEASE: Primary | ICD-10-CM

## 2024-01-23 DIAGNOSIS — M48.061 BILATERAL STENOSIS OF LATERAL RECESS OF LUMBAR SPINE: ICD-10-CM

## 2024-01-23 PROCEDURE — 1090F PRES/ABSN URINE INCON ASSESS: CPT | Performed by: NURSE PRACTITIONER

## 2024-01-23 PROCEDURE — 1036F TOBACCO NON-USER: CPT | Performed by: NURSE PRACTITIONER

## 2024-01-23 PROCEDURE — G8484 FLU IMMUNIZE NO ADMIN: HCPCS | Performed by: NURSE PRACTITIONER

## 2024-01-23 PROCEDURE — 99213 OFFICE O/P EST LOW 20 MIN: CPT | Performed by: NURSE PRACTITIONER

## 2024-01-23 PROCEDURE — 1123F ACP DISCUSS/DSCN MKR DOCD: CPT | Performed by: NURSE PRACTITIONER

## 2024-01-23 PROCEDURE — G8400 PT W/DXA NO RESULTS DOC: HCPCS | Performed by: NURSE PRACTITIONER

## 2024-01-23 PROCEDURE — G8417 CALC BMI ABV UP PARAM F/U: HCPCS | Performed by: NURSE PRACTITIONER

## 2024-01-23 PROCEDURE — G8428 CUR MEDS NOT DOCUMENT: HCPCS | Performed by: NURSE PRACTITIONER

## 2024-01-23 NOTE — PROGRESS NOTES
Name: Gage Pelletier  YOB: 1945  Gender: female  MRN: [id number]    CC: Follow-up right leg pain, low back pain    HPI: This is a 78 y.o. year old female who returns today after lumbar injections.     Patient has a history of L4-5 fusion by Josefa in 2021.  She developed complaints of back pain and not being able to stand for greater than 5 minutes.  Then she developed pain down the right posterior leg.  She was getting ready to go to an upcoming trip with the [place].  She had an MRI of the lumbar spine that revealed central disc protrusion at L5-S1 with lateral recess stenosis.  There is also some stenosis at 3 4 but was not severe.  Patient was referred for an L5-S1 LUISANA.  She states it took about 3 weeks but she currently has no pain.  She states that she is pleased with her response.  Percentage of pain relief: 100%           12/7/2023     2:20 PM   AMB PAIN ASSESSMENT   Location of Pain Back   Frequency of Pain Intermittent   Limiting Behavior Yes   Result of Injury No   Work-Related Injury No   Are there other pain locations you wish to document? No           Allergies   Allergen Reactions    Latex      Other reaction(s): Rash-Allergy    Diphenhydramine Hives and Other (See Comments)    Penicillins Hives and Other (See Comments)    Hydromorphone Nausea And Vomiting    Povidone-Iodine Rash     Localized rash at site of use       Current Outpatient Medications:     clonazePAM (KLONOPIN) 0.5 MG tablet, Take 0.5 tablets by mouth once for 1 dose. Take 1/2 tablet one hour prior to MRI, may repeat in 30 min if necessary Max Daily Amount: 0.25 mg, Disp: 1 tablet, Rfl: 0    vitamin B-12 (CYANOCOBALAMIN) 1000 MCG tablet, Take 1 tablet by mouth daily, Disp: , Rfl:     pantoprazole (PROTONIX) 40 MG tablet, , Disp: , Rfl:     triamcinolone (ARISTOCORT) 0.5 % cream, , Disp: , Rfl:     atorvastatin (LIPITOR) 80 MG tablet, TAKE 1 TABLET DAILY, Disp: 90 tablet, Rfl: 3    carvedilol (COREG) 6.25

## 2024-05-29 NOTE — PROGRESS NOTES
06/22/21 1224   Dual Skin Pressure Injury Assessment   Dual Skin Pressure Injury Assessment WDL   Second Care Provider (Based on [address] [place] [address]) Godfrey Ortiz RN on unit

## 2024-06-03 ENCOUNTER — OFFICE VISIT (OUTPATIENT)
Age: 79
End: 2024-06-03
Payer: MEDICARE

## 2024-06-03 VITALS
BODY MASS INDEX: 36.85 KG/M2 | DIASTOLIC BLOOD PRESSURE: 60 MMHG | HEIGHT: 61 IN | SYSTOLIC BLOOD PRESSURE: 122 MMHG | WEIGHT: 195.2 LBS | HEART RATE: 63 BPM

## 2024-06-03 DIAGNOSIS — K74.69 OTHER CIRRHOSIS OF LIVER (HCC): Chronic | ICD-10-CM

## 2024-06-03 DIAGNOSIS — I25.10 CORONARY ARTERY DISEASE INVOLVING NATIVE CORONARY ARTERY OF NATIVE HEART WITHOUT ANGINA PECTORIS: ICD-10-CM

## 2024-06-03 DIAGNOSIS — E78.5 DYSLIPIDEMIA: ICD-10-CM

## 2024-06-03 DIAGNOSIS — I10 PRIMARY HYPERTENSION: Primary | ICD-10-CM

## 2024-06-03 PROCEDURE — G8417 CALC BMI ABV UP PARAM F/U: HCPCS | Performed by: INTERNAL MEDICINE

## 2024-06-03 PROCEDURE — 3078F DIAST BP <80 MM HG: CPT | Performed by: INTERNAL MEDICINE

## 2024-06-03 PROCEDURE — 99214 OFFICE O/P EST MOD 30 MIN: CPT | Performed by: INTERNAL MEDICINE

## 2024-06-03 PROCEDURE — 3074F SYST BP LT 130 MM HG: CPT | Performed by: INTERNAL MEDICINE

## 2024-06-03 PROCEDURE — G8400 PT W/DXA NO RESULTS DOC: HCPCS | Performed by: INTERNAL MEDICINE

## 2024-06-03 PROCEDURE — 1036F TOBACCO NON-USER: CPT | Performed by: INTERNAL MEDICINE

## 2024-06-03 PROCEDURE — G8427 DOCREV CUR MEDS BY ELIG CLIN: HCPCS | Performed by: INTERNAL MEDICINE

## 2024-06-03 PROCEDURE — 1090F PRES/ABSN URINE INCON ASSESS: CPT | Performed by: INTERNAL MEDICINE

## 2024-06-03 PROCEDURE — 1123F ACP DISCUSS/DSCN MKR DOCD: CPT | Performed by: INTERNAL MEDICINE

## 2024-06-03 PROCEDURE — 93000 ELECTROCARDIOGRAM COMPLETE: CPT | Performed by: INTERNAL MEDICINE

## 2024-06-03 ASSESSMENT — ENCOUNTER SYMPTOMS
SHORTNESS OF BREATH: 0
BACK PAIN: 0
ABDOMINAL PAIN: 0
COUGH: 0

## 2024-06-03 NOTE — PROGRESS NOTES
[facility] CARDIOLOGY  [address] [place] [address], [address]  [place] [address]      24      NAME:  Gage Pelletier  : 1945  MRN: [id number]      SUBJECTIVE:   Gage Pelletier is a 78 y.o. female seen for a follow up visit regarding the following:     Chief Complaint   Patient presents with    Coronary Artery Disease    Hypertension       HPI:    78 y.o. female with no prior cardiac history who presented with non-ST elevation myocardial infarction to hospital in [facility]  2021.  Patient had PCI and stenting of the left circumflex and LAD.  LVEF was normal.  Patient's medical therapy is appropriate.  Her activity has been limited by severe spinal stenosis    Patient was admitted 10/2022 with upper GI bleeding.  She was diagnosed with nonalcoholic fatty liver cirrhosis with associated portal hypertension and esophageal varices.  She has been stable with moderate exertional dyspnea.            Past Medical History, Past Surgical History, Family history, Social History, and Medications were all reviewed with the patient today and updated as necessary.     Current Outpatient Medications   Medication Sig Dispense Refill    vitamin B-12 (CYANOCOBALAMIN) 1000 MCG tablet Take 1 tablet by mouth daily      pantoprazole (PROTONIX) 40 MG tablet 1 tablet in the morning and at bedtime      triamcinolone (ARISTOCORT) 0.5 % cream       atorvastatin (LIPITOR) 80 MG tablet TAKE 1 TABLET DAILY 90 tablet 3    carvedilol (COREG) 6.25 MG tablet Take 1 tablet by mouth 2 times daily      aspirin 81 MG EC tablet Take by mouth daily      Biotin 5 MG TABS Take 1 tablet by mouth daily      vitamin D (CHOLECALCIFEROL) 25 MCG (1000 UT) TABS tablet Take 1 tablet by mouth daily      dextran 70-hypromellose (TEARS NATURALE) 0.1-0.3 % SOLN opthalmic solution Apply 2 drops to eye as needed      fluticasone (FLONASE) 50 MCG/ACT nasal spray 2 sprays by Nasal route as needed      gabapentin (NEURONTIN) 100 MG capsule Take 1

## 2024-12-05 ENCOUNTER — OFFICE VISIT (OUTPATIENT)
Age: 79
End: 2024-12-05
Payer: MEDICARE

## 2024-12-05 VITALS
WEIGHT: 198 LBS | SYSTOLIC BLOOD PRESSURE: 134 MMHG | HEART RATE: 72 BPM | HEIGHT: 61 IN | DIASTOLIC BLOOD PRESSURE: 68 MMHG | BODY MASS INDEX: 37.38 KG/M2

## 2024-12-05 DIAGNOSIS — E78.5 DYSLIPIDEMIA: ICD-10-CM

## 2024-12-05 DIAGNOSIS — I25.10 CORONARY ARTERY DISEASE INVOLVING NATIVE CORONARY ARTERY OF NATIVE HEART WITHOUT ANGINA PECTORIS: Primary | ICD-10-CM

## 2024-12-05 DIAGNOSIS — I10 PRIMARY HYPERTENSION: ICD-10-CM

## 2024-12-05 DIAGNOSIS — K74.69 OTHER CIRRHOSIS OF LIVER (HCC): Chronic | ICD-10-CM

## 2024-12-05 PROCEDURE — 1126F AMNT PAIN NOTED NONE PRSNT: CPT | Performed by: INTERNAL MEDICINE

## 2024-12-05 PROCEDURE — G8417 CALC BMI ABV UP PARAM F/U: HCPCS | Performed by: INTERNAL MEDICINE

## 2024-12-05 PROCEDURE — G8428 CUR MEDS NOT DOCUMENT: HCPCS | Performed by: INTERNAL MEDICINE

## 2024-12-05 PROCEDURE — 99214 OFFICE O/P EST MOD 30 MIN: CPT | Performed by: INTERNAL MEDICINE

## 2024-12-05 PROCEDURE — 1123F ACP DISCUSS/DSCN MKR DOCD: CPT | Performed by: INTERNAL MEDICINE

## 2024-12-05 PROCEDURE — G8484 FLU IMMUNIZE NO ADMIN: HCPCS | Performed by: INTERNAL MEDICINE

## 2024-12-05 PROCEDURE — 1090F PRES/ABSN URINE INCON ASSESS: CPT | Performed by: INTERNAL MEDICINE

## 2024-12-05 PROCEDURE — 3078F DIAST BP <80 MM HG: CPT | Performed by: INTERNAL MEDICINE

## 2024-12-05 PROCEDURE — G8400 PT W/DXA NO RESULTS DOC: HCPCS | Performed by: INTERNAL MEDICINE

## 2024-12-05 PROCEDURE — 1036F TOBACCO NON-USER: CPT | Performed by: INTERNAL MEDICINE

## 2024-12-05 PROCEDURE — 3075F SYST BP GE 130 - 139MM HG: CPT | Performed by: INTERNAL MEDICINE

## 2024-12-05 RX ORDER — GINSENG 100 MG
50 CAPSULE ORAL DAILY
COMMUNITY

## 2024-12-05 RX ORDER — DIMENHYDRINATE 50 MG
TABLET ORAL
COMMUNITY

## 2024-12-05 ASSESSMENT — ENCOUNTER SYMPTOMS
BACK PAIN: 0
SHORTNESS OF BREATH: 0
ABDOMINAL PAIN: 0
COUGH: 0

## 2024-12-05 NOTE — PROGRESS NOTES
[facility] CARDIOLOGY  [address] [place] [address], [address]  [place] [address]      24      NAME:  Gage Pelletier  : 1945  MRN: [id number]      SUBJECTIVE:   Gage Pelletier is a 79 y.o. female seen for a follow up visit regarding the following:     Chief Complaint   Patient presents with    Hypertension    Coronary Artery Disease       HPI:    79 y.o. female with no prior cardiac history who presented with non-ST elevation myocardial infarction to hospital in [facility]  2021.  Patient had PCI and stenting of the left circumflex and LAD.  LVEF was normal.  Patient's medical therapy is appropriate.  Her activity has been limited by severe spinal stenosis.  Patient was admitted 10/2022 with upper GI bleeding.  She was diagnosed with nonalcoholic fatty liver cirrhosis with associated portal hypertension and esophageal varices.  She has been stable with moderate exertional dyspnea.            Past Medical History, Past Surgical History, Family history, Social History, and Medications were all reviewed with the patient today and updated as necessary.     Current Outpatient Medications   Medication Sig Dispense Refill    zinc 50 MG TABS tablet Take 1 tablet by mouth daily      Iron Combinations (IRON COMPLEX PO) Take by mouth      Coenzyme Q10 (CO Q-10) 100 MG CAPS Take by mouth      vitamin B-12 (CYANOCOBALAMIN) 1000 MCG tablet Take 1 tablet by mouth daily      pantoprazole (PROTONIX) 40 MG tablet 1 tablet daily      triamcinolone (ARISTOCORT) 0.5 % cream       atorvastatin (LIPITOR) 80 MG tablet TAKE 1 TABLET DAILY 90 tablet 3    carvedilol (COREG) 6.25 MG tablet Take 1 tablet by mouth 2 times daily      aspirin 81 MG EC tablet Take by mouth daily      Biotin 5 MG TABS Take 1 tablet by mouth daily      vitamin D (CHOLECALCIFEROL) 25 MCG (1000 UT) TABS tablet Take 1 tablet by mouth daily      dextran 70-hypromellose (TEARS NATURALE) 0.1-0.3 % SOLN opthalmic solution Apply 2 drops to eye as

## 2025-01-31 DIAGNOSIS — R06.00 DYSPNEA: Primary | ICD-10-CM

## 2025-06-30 ENCOUNTER — OFFICE VISIT (OUTPATIENT)
Age: 80
End: 2025-06-30
Payer: MEDICARE

## 2025-06-30 VITALS
HEIGHT: 62 IN | SYSTOLIC BLOOD PRESSURE: 140 MMHG | BODY MASS INDEX: 31.83 KG/M2 | HEART RATE: 70 BPM | WEIGHT: 173 LBS | DIASTOLIC BLOOD PRESSURE: 70 MMHG

## 2025-06-30 DIAGNOSIS — I25.10 CORONARY ARTERY DISEASE INVOLVING NATIVE CORONARY ARTERY OF NATIVE HEART WITHOUT ANGINA PECTORIS: Primary | ICD-10-CM

## 2025-06-30 DIAGNOSIS — E78.5 DYSLIPIDEMIA: ICD-10-CM

## 2025-06-30 DIAGNOSIS — I10 PRIMARY HYPERTENSION: ICD-10-CM

## 2025-06-30 DIAGNOSIS — K74.69 OTHER CIRRHOSIS OF LIVER (HCC): Chronic | ICD-10-CM

## 2025-06-30 PROCEDURE — G8417 CALC BMI ABV UP PARAM F/U: HCPCS | Performed by: INTERNAL MEDICINE

## 2025-06-30 PROCEDURE — 1036F TOBACCO NON-USER: CPT | Performed by: INTERNAL MEDICINE

## 2025-06-30 PROCEDURE — 3077F SYST BP >= 140 MM HG: CPT | Performed by: INTERNAL MEDICINE

## 2025-06-30 PROCEDURE — 1126F AMNT PAIN NOTED NONE PRSNT: CPT | Performed by: INTERNAL MEDICINE

## 2025-06-30 PROCEDURE — G8400 PT W/DXA NO RESULTS DOC: HCPCS | Performed by: INTERNAL MEDICINE

## 2025-06-30 PROCEDURE — G8428 CUR MEDS NOT DOCUMENT: HCPCS | Performed by: INTERNAL MEDICINE

## 2025-06-30 PROCEDURE — 99214 OFFICE O/P EST MOD 30 MIN: CPT | Performed by: INTERNAL MEDICINE

## 2025-06-30 PROCEDURE — 1123F ACP DISCUSS/DSCN MKR DOCD: CPT | Performed by: INTERNAL MEDICINE

## 2025-06-30 PROCEDURE — 1090F PRES/ABSN URINE INCON ASSESS: CPT | Performed by: INTERNAL MEDICINE

## 2025-06-30 PROCEDURE — 3078F DIAST BP <80 MM HG: CPT | Performed by: INTERNAL MEDICINE

## 2025-06-30 RX ORDER — CARVEDILOL 12.5 MG/1
12.5 TABLET ORAL 2 TIMES DAILY
Qty: 180 TABLET | Refills: 3 | Status: SHIPPED | OUTPATIENT
Start: 2025-06-30

## 2025-06-30 RX ORDER — ISOSORBIDE MONONITRATE 30 MG/1
30 TABLET, EXTENDED RELEASE ORAL DAILY
Qty: 90 TABLET | Refills: 3 | Status: SHIPPED | OUTPATIENT
Start: 2025-06-30

## 2025-06-30 RX ORDER — ATORVASTATIN CALCIUM 80 MG/1
80 TABLET, FILM COATED ORAL DAILY
Qty: 90 TABLET | Refills: 3 | Status: SHIPPED | OUTPATIENT
Start: 2025-06-30

## 2025-06-30 ASSESSMENT — ENCOUNTER SYMPTOMS
SHORTNESS OF BREATH: 0
ABDOMINAL PAIN: 0
BACK PAIN: 0
COUGH: 0

## 2025-06-30 NOTE — PROGRESS NOTES
She was found to have portal hypertension and severe esophageal varices.        Problem List Items Addressed This Visit          Circulatory    Coronary artery disease involving native coronary artery of native heart without angina pectoris - Primary    Relevant Medications    atorvastatin (LIPITOR) 80 MG tablet    carvedilol (COREG) 12.5 MG tablet    isosorbide mononitrate (IMDUR) 30 MG extended release tablet    Other Relevant Orders    Nuclear stress test with myocardial perfusion    Primary hypertension    Relevant Medications    atorvastatin (LIPITOR) 80 MG tablet    carvedilol (COREG) 12.5 MG tablet    isosorbide mononitrate (IMDUR) 30 MG extended release tablet       Digestive    Other cirrhosis of liver (HCC) (Chronic)       Other    Dyslipidemia         Medications Discontinued During This Encounter   Medication Reason    clonazePAM (KLONOPIN) 0.5 MG tablet LIST CLEANUP    carvedilol (COREG) 6.25 MG tablet REORDER    atorvastatin (LIPITOR) 80 MG tablet REORDER             Patient has been instructed and agrees to call our office with any issues or other concerns related to their cardiac condition(s) and/or complaint(s).      Return in about 3 months (around 9/30/2025).       FRANK SARABIA MD  6/30/2025

## (undated) DEVICE — BLUNT DISSECTOR: Brand: ENDO PEANUT

## (undated) DEVICE — SUTURE VCRL + 3-0 L27IN ABSRB UD PS-2 L19MM 3/8 CIR PRIM VCP427H

## (undated) DEVICE — SUTURE VCRL SZ 2-0 L27IN ABSRB UD L36MM CP-1 1/2 CIR REV J266H

## (undated) DEVICE — TRAY,URINE METER,100% SILICONE,16FR10ML: Brand: MEDLINE

## (undated) DEVICE — CORD,CAUTERY,BIPOLAR,STERILE: Brand: MEDLINE

## (undated) DEVICE — REM POLYHESIVE ADULT PATIENT RETURN ELECTRODE: Brand: VALLEYLAB

## (undated) DEVICE — INTENDED FOR TISSUE SEPARATION, AND OTHER PROCEDURES THAT REQUIRE A SHARP SURGICAL BLADE TO PUNCTURE OR CUT.: Brand: BARD-PARKER SAFETY BLADES SIZE 10, STERILE

## (undated) DEVICE — CARDINAL HEALTH FLEXIBLE LIGHT HANDLE COVER: Brand: CARDINAL HEALTH

## (undated) DEVICE — KIT EVAC 0.13IN RECT TB DIA10FR 400CC PVC 3 SPR Y CONN DRN

## (undated) DEVICE — AGENT HEMSTAT 8ML FLX TIP MTRX + DISP SURGIFLO

## (undated) DEVICE — GAUZE,SPONGE,8"X4",12PLY,XRAY,STRL,LF: Brand: MEDLINE

## (undated) DEVICE — BONE WAX WHITE: Brand: BONE WAX WHITE

## (undated) DEVICE — 3M™ IOBAN™ 2 ANTIMICROBIAL INCISE DRAPE 6650EZ: Brand: IOBAN™ 2

## (undated) DEVICE — GARMENT,MEDLINE,DVT,INT,CALF,MED, GEN2: Brand: MEDLINE

## (undated) DEVICE — PACK PROCEDURE SURG POST LAMINECTOMY CDS

## (undated) DEVICE — STRIP SKIN CLSR W1XL5IN NYL REINF CURAD

## (undated) DEVICE — SOLUTION IV 1000ML 0.9% SOD CHL

## (undated) DEVICE — Z DUP USE 2701075 SYSTEM SKIN CLSR 42CM DERMBND PRINEO

## (undated) DEVICE — SUTURE VCRL SZ 1 L27IN ABSRB UD L36MM CP-1 1/2 CIR REV CUT J268H

## (undated) DEVICE — KIT POS W/ FOAM ARM CRADL SHEARGUARD CHST PD CVR FOR SPNL

## (undated) DEVICE — UNIVERSAL DRAPES: Brand: MEDLINE INDUSTRIES, INC.

## (undated) DEVICE — BIPOLAR SEALER 23-113-1 AQM 2.3 OM NEURO: Brand: AQUAMANTYS ®

## (undated) DEVICE — MODULAR TAP: Brand: XIA 4.5 SYSTEM -  XIA CT

## (undated) DEVICE — 3M™ STERI-DRAPE™ INSTRUMENT POUCH 1018: Brand: STERI-DRAPE™

## (undated) DEVICE — INTENDED FOR TISSUE SEPARATION, AND OTHER PROCEDURES THAT REQUIRE A SHARP SURGICAL BLADE TO PUNCTURE OR CUT.: Brand: BARD-PARKER SAFETY BLADES SIZE 15, STERILE

## (undated) DEVICE — NEUROMONITORING PROBE: Brand: AVS ARIA

## (undated) DEVICE — PACKING 8004007 NEURAY 200PK 13X76MM: Brand: NEURAY ®

## (undated) DEVICE — CONTAINER,SPECIMEN,O.R.STRL,4.5OZ: Brand: MEDLINE

## (undated) DEVICE — 5.0MM PRECISION ROUND

## (undated) DEVICE — PENCIL ES L12IN BAYNT MPLR DISP BOVIE

## (undated) DEVICE — DRILL BIT: Brand: XIA 4.5 SYSTEM -  XIA CT

## (undated) DEVICE — DRAPE C-ARMOUR C-ARM KIT --

## (undated) DEVICE — 1010 S-DRAPE TOWEL DRAPE 10/BX: Brand: STERI-DRAPE™

## (undated) DEVICE — POSTERIOR LAMI VANPLT-LUCAS: Brand: MEDLINE INDUSTRIES, INC.

## (undated) DEVICE — DRAPE TWL SURG 16X26IN BLU ORB04] ALLCARE INC]

## (undated) DEVICE — DRAPE SHT 3 QTR PROXIMA 53X77 --

## (undated) DEVICE — ABSORBENT, WATERPROOF, BACTERIA PROOF FILM DRESSING: Brand: OPSITE POST OP 9.5X8.5CM CTN 20

## (undated) DEVICE — C-ARM: Brand: UNBRANDED

## (undated) DEVICE — 2000CC GUARDIAN II: Brand: GUARDIAN

## (undated) DEVICE — ABSORBENT, WATERPROOF, BACTERIA PROOF FILM DRESSING: Brand: OPSITE POST OP 10X35CM CTN 20